# Patient Record
Sex: MALE | Race: WHITE | NOT HISPANIC OR LATINO | Employment: FULL TIME | URBAN - METROPOLITAN AREA
[De-identification: names, ages, dates, MRNs, and addresses within clinical notes are randomized per-mention and may not be internally consistent; named-entity substitution may affect disease eponyms.]

---

## 2017-04-07 ENCOUNTER — RECORDS - HEALTHEAST (OUTPATIENT)
Dept: LAB | Facility: CLINIC | Age: 52
End: 2017-04-07

## 2017-04-07 LAB
CHOLEST SERPL-MCNC: 210 MG/DL
FASTING STATUS PATIENT QL REPORTED: NO
HDLC SERPL-MCNC: 47 MG/DL
LDLC SERPL CALC-MCNC: 139 MG/DL
TRIGL SERPL-MCNC: 121 MG/DL

## 2017-11-14 ENCOUNTER — RECORDS - HEALTHEAST (OUTPATIENT)
Dept: LAB | Facility: CLINIC | Age: 52
End: 2017-11-14

## 2017-11-14 LAB — PSA SERPL-MCNC: 0.8 NG/ML (ref 0–3.5)

## 2019-03-08 ENCOUNTER — RECORDS - HEALTHEAST (OUTPATIENT)
Dept: LAB | Facility: CLINIC | Age: 54
End: 2019-03-08

## 2019-03-08 LAB
ANION GAP SERPL CALCULATED.3IONS-SCNC: 8 MMOL/L (ref 5–18)
BUN SERPL-MCNC: 23 MG/DL (ref 8–22)
CALCIUM SERPL-MCNC: 9.3 MG/DL (ref 8.5–10.5)
CHLORIDE BLD-SCNC: 108 MMOL/L (ref 98–107)
CO2 SERPL-SCNC: 24 MMOL/L (ref 22–31)
CREAT SERPL-MCNC: 1.03 MG/DL (ref 0.7–1.3)
GFR SERPL CREATININE-BSD FRML MDRD: >60 ML/MIN/1.73M2
GLUCOSE BLD-MCNC: 87 MG/DL (ref 70–125)
POTASSIUM BLD-SCNC: 4.5 MMOL/L (ref 3.5–5)
SODIUM SERPL-SCNC: 140 MMOL/L (ref 136–145)
T4 FREE SERPL-MCNC: 0.7 NG/DL (ref 0.7–1.8)
TSH SERPL DL<=0.005 MIU/L-ACNC: 5.42 UIU/ML (ref 0.3–5)

## 2019-09-13 ENCOUNTER — RECORDS - HEALTHEAST (OUTPATIENT)
Dept: LAB | Facility: CLINIC | Age: 54
End: 2019-09-13

## 2019-09-13 LAB
ANION GAP SERPL CALCULATED.3IONS-SCNC: 8 MMOL/L (ref 5–18)
BUN SERPL-MCNC: 20 MG/DL (ref 8–22)
CALCIUM SERPL-MCNC: 9.5 MG/DL (ref 8.5–10.5)
CHLORIDE BLD-SCNC: 108 MMOL/L (ref 98–107)
CHOLEST SERPL-MCNC: 205 MG/DL
CO2 SERPL-SCNC: 24 MMOL/L (ref 22–31)
CREAT SERPL-MCNC: 1.13 MG/DL (ref 0.7–1.3)
FASTING STATUS PATIENT QL REPORTED: YES
GFR SERPL CREATININE-BSD FRML MDRD: >60 ML/MIN/1.73M2
GLUCOSE BLD-MCNC: 93 MG/DL (ref 70–125)
HDLC SERPL-MCNC: 46 MG/DL
LDLC SERPL CALC-MCNC: 137 MG/DL
POTASSIUM BLD-SCNC: 4.4 MMOL/L (ref 3.5–5)
SODIUM SERPL-SCNC: 140 MMOL/L (ref 136–145)
TRIGL SERPL-MCNC: 108 MG/DL
TSH SERPL DL<=0.005 MIU/L-ACNC: 0.99 UIU/ML (ref 0.3–5)

## 2020-03-13 ENCOUNTER — RECORDS - HEALTHEAST (OUTPATIENT)
Dept: LAB | Facility: CLINIC | Age: 55
End: 2020-03-13

## 2020-03-13 LAB
PSA SERPL-MCNC: 0.6 NG/ML (ref 0–3.5)
TSH SERPL DL<=0.005 MIU/L-ACNC: 4 UIU/ML (ref 0.3–5)

## 2021-06-18 ENCOUNTER — RECORDS - HEALTHEAST (OUTPATIENT)
Dept: LAB | Facility: CLINIC | Age: 56
End: 2021-06-18

## 2021-06-18 LAB
ANION GAP SERPL CALCULATED.3IONS-SCNC: 8 MMOL/L (ref 5–18)
BUN SERPL-MCNC: 19 MG/DL (ref 8–22)
CALCIUM SERPL-MCNC: 8.5 MG/DL (ref 8.5–10.5)
CHLORIDE BLD-SCNC: 106 MMOL/L (ref 98–107)
CHOLEST SERPL-MCNC: 196 MG/DL
CO2 SERPL-SCNC: 24 MMOL/L (ref 22–31)
CREAT SERPL-MCNC: 0.98 MG/DL (ref 0.7–1.3)
FASTING STATUS PATIENT QL REPORTED: NORMAL
GFR SERPL CREATININE-BSD FRML MDRD: >60 ML/MIN/1.73M2
GLUCOSE BLD-MCNC: 93 MG/DL (ref 70–125)
HDLC SERPL-MCNC: 42 MG/DL
LDLC SERPL CALC-MCNC: 129 MG/DL
POTASSIUM BLD-SCNC: 4.2 MMOL/L (ref 3.5–5)
PSA SERPL-MCNC: 0.5 NG/ML (ref 0–3.5)
SODIUM SERPL-SCNC: 138 MMOL/L (ref 136–145)
T3FREE SERPL-MCNC: 7.6 PG/ML (ref 1.9–3.9)
T4 FREE SERPL-MCNC: 0.9 NG/DL (ref 0.7–1.8)
TRIGL SERPL-MCNC: 127 MG/DL
TSH SERPL DL<=0.005 MIU/L-ACNC: 2.03 UIU/ML (ref 0.3–5)

## 2021-08-27 ENCOUNTER — LAB REQUISITION (OUTPATIENT)
Dept: LAB | Facility: CLINIC | Age: 56
End: 2021-08-27

## 2021-08-27 DIAGNOSIS — E03.9 HYPOTHYROIDISM, UNSPECIFIED: ICD-10-CM

## 2021-08-27 LAB
T4 FREE SERPL-MCNC: 0.86 NG/DL (ref 0.7–1.8)
TSH SERPL DL<=0.005 MIU/L-ACNC: 0.15 UIU/ML (ref 0.3–5)

## 2021-08-27 PROCEDURE — 84439 ASSAY OF FREE THYROXINE: CPT | Performed by: FAMILY MEDICINE

## 2021-08-27 PROCEDURE — 84443 ASSAY THYROID STIM HORMONE: CPT | Performed by: FAMILY MEDICINE

## 2021-10-11 ENCOUNTER — LAB REQUISITION (OUTPATIENT)
Dept: LAB | Facility: CLINIC | Age: 56
End: 2021-10-11

## 2021-10-11 DIAGNOSIS — E03.9 HYPOTHYROIDISM, UNSPECIFIED: ICD-10-CM

## 2021-10-11 LAB — TSH SERPL DL<=0.005 MIU/L-ACNC: 2.51 UIU/ML (ref 0.3–5)

## 2021-10-11 PROCEDURE — 84443 ASSAY THYROID STIM HORMONE: CPT | Performed by: FAMILY MEDICINE

## 2021-10-24 ENCOUNTER — HOSPITAL ENCOUNTER (INPATIENT)
Facility: HOSPITAL | Age: 56
LOS: 2 days | Discharge: HOME | End: 2021-10-27
Attending: EMERGENCY MEDICINE | Admitting: HOSPITALIST
Payer: COMMERCIAL

## 2021-10-24 ENCOUNTER — APPOINTMENT (OUTPATIENT)
Dept: CT IMAGING | Facility: HOSPITAL | Age: 56
End: 2021-10-24
Attending: EMERGENCY MEDICINE
Payer: COMMERCIAL

## 2021-10-24 ENCOUNTER — OFFICE VISIT (OUTPATIENT)
Dept: FAMILY MEDICINE | Facility: CLINIC | Age: 56
End: 2021-10-24
Payer: COMMERCIAL

## 2021-10-24 VITALS
DIASTOLIC BLOOD PRESSURE: 80 MMHG | TEMPERATURE: 98.7 F | SYSTOLIC BLOOD PRESSURE: 123 MMHG | OXYGEN SATURATION: 92 % | HEART RATE: 87 BPM

## 2021-10-24 DIAGNOSIS — K42.0 UMBILICAL HERNIA WITH OBSTRUCTION: ICD-10-CM

## 2021-10-24 DIAGNOSIS — R10.33 PERIUMBILICAL ABDOMINAL PAIN: Primary | ICD-10-CM

## 2021-10-24 PROBLEM — I10 HYPERTENSION: Status: ACTIVE | Noted: 2021-10-24

## 2021-10-24 PROBLEM — E03.4 ACQUIRED ATROPHY OF THYROID: Status: ACTIVE | Noted: 2021-10-24

## 2021-10-24 PROBLEM — E03.9 HYPOTHYROIDISM: Status: ACTIVE | Noted: 2021-10-24

## 2021-10-24 PROBLEM — Z72.0 TOBACCO USER: Status: ACTIVE | Noted: 2021-10-24

## 2021-10-24 PROBLEM — E66.9 OBESITY: Status: ACTIVE | Noted: 2021-10-24

## 2021-10-24 PROBLEM — B37.2 CANDIDIASIS OF SKIN AND NAILS: Status: ACTIVE | Noted: 2021-10-24

## 2021-10-24 LAB
ALBUMIN SERPL-MCNC: 4.2 G/DL (ref 3.5–5)
ALP SERPL-CCNC: 99 U/L (ref 45–120)
ALT SERPL W P-5'-P-CCNC: 23 U/L (ref 0–45)
ANION GAP SERPL CALCULATED.3IONS-SCNC: 16 MMOL/L (ref 5–18)
AST SERPL W P-5'-P-CCNC: 19 U/L (ref 0–40)
BILIRUB SERPL-MCNC: 1.1 MG/DL (ref 0–1)
BUN SERPL-MCNC: 28 MG/DL (ref 8–22)
CALCIUM SERPL-MCNC: 10.9 MG/DL (ref 8.5–10.5)
CHLORIDE BLD-SCNC: 94 MMOL/L (ref 98–107)
CO2 SERPL-SCNC: 30 MMOL/L (ref 22–31)
CREAT SERPL-MCNC: 1.82 MG/DL (ref 0.7–1.3)
ERYTHROCYTE [DISTWIDTH] IN BLOOD BY AUTOMATED COUNT: 13.5 % (ref 10–15)
GFR SERPL CREATININE-BSD FRML MDRD: 40 ML/MIN/1.73M2
GLUCOSE BLD-MCNC: 120 MG/DL (ref 70–125)
HCT VFR BLD AUTO: 49.9 % (ref 40–53)
HGB BLD-MCNC: 17 G/DL (ref 13.3–17.7)
LACTATE SERPL-SCNC: 1.3 MMOL/L (ref 0.7–2)
LIPASE SERPL-CCNC: 29 U/L (ref 0–52)
MCH RBC QN AUTO: 31.4 PG (ref 26.5–33)
MCHC RBC AUTO-ENTMCNC: 34.1 G/DL (ref 31.5–36.5)
MCV RBC AUTO: 92 FL (ref 78–100)
PLATELET # BLD AUTO: 253 10E3/UL (ref 150–450)
POTASSIUM BLD-SCNC: 3.9 MMOL/L (ref 3.5–5)
PROT SERPL-MCNC: 7.7 G/DL (ref 6–8)
RBC # BLD AUTO: 5.42 10E6/UL (ref 4.4–5.9)
SARS-COV-2 RNA RESP QL NAA+PROBE: NEGATIVE
SODIUM SERPL-SCNC: 140 MMOL/L (ref 136–145)
WBC # BLD AUTO: 12.3 10E3/UL (ref 4–11)

## 2021-10-24 PROCEDURE — 80053 COMPREHEN METABOLIC PANEL: CPT | Performed by: EMERGENCY MEDICINE

## 2021-10-24 PROCEDURE — 99215 OFFICE O/P EST HI 40 MIN: CPT | Performed by: NURSE PRACTITIONER

## 2021-10-24 PROCEDURE — 250N000009 HC RX 250: Performed by: EMERGENCY MEDICINE

## 2021-10-24 PROCEDURE — 83690 ASSAY OF LIPASE: CPT | Performed by: EMERGENCY MEDICINE

## 2021-10-24 PROCEDURE — 36415 COLL VENOUS BLD VENIPUNCTURE: CPT | Performed by: EMERGENCY MEDICINE

## 2021-10-24 PROCEDURE — 83605 ASSAY OF LACTIC ACID: CPT | Performed by: EMERGENCY MEDICINE

## 2021-10-24 PROCEDURE — 96375 TX/PRO/DX INJ NEW DRUG ADDON: CPT

## 2021-10-24 PROCEDURE — 96361 HYDRATE IV INFUSION ADD-ON: CPT

## 2021-10-24 PROCEDURE — 87635 SARS-COV-2 COVID-19 AMP PRB: CPT | Performed by: EMERGENCY MEDICINE

## 2021-10-24 PROCEDURE — 250N000011 HC RX IP 250 OP 636: Performed by: EMERGENCY MEDICINE

## 2021-10-24 PROCEDURE — 258N000003 HC RX IP 258 OP 636: Performed by: EMERGENCY MEDICINE

## 2021-10-24 PROCEDURE — G0378 HOSPITAL OBSERVATION PER HR: HCPCS

## 2021-10-24 PROCEDURE — 96374 THER/PROPH/DIAG INJ IV PUSH: CPT | Mod: 59

## 2021-10-24 PROCEDURE — 99285 EMERGENCY DEPT VISIT HI MDM: CPT | Mod: 25

## 2021-10-24 PROCEDURE — 74177 CT ABD & PELVIS W/CONTRAST: CPT

## 2021-10-24 PROCEDURE — C9803 HOPD COVID-19 SPEC COLLECT: HCPCS

## 2021-10-24 PROCEDURE — 85027 COMPLETE CBC AUTOMATED: CPT | Performed by: EMERGENCY MEDICINE

## 2021-10-24 RX ORDER — THYROID, PORCINE 240 MG/1
240 TABLET ORAL DAILY
COMMUNITY
Start: 2021-09-30

## 2021-10-24 RX ORDER — IOPAMIDOL 755 MG/ML
75 INJECTION, SOLUTION INTRAVASCULAR ONCE
Status: COMPLETED | OUTPATIENT
Start: 2021-10-24 | End: 2021-10-24

## 2021-10-24 RX ORDER — LISINOPRIL 10 MG/1
10 TABLET ORAL DAILY
COMMUNITY
Start: 2021-08-19

## 2021-10-24 RX ORDER — MULTIPLE VITAMINS W/ MINERALS TAB 9MG-400MCG
1 TAB ORAL DAILY
COMMUNITY
End: 2021-11-03

## 2021-10-24 RX ORDER — ONDANSETRON 2 MG/ML
4 INJECTION INTRAMUSCULAR; INTRAVENOUS ONCE
Status: COMPLETED | OUTPATIENT
Start: 2021-10-24 | End: 2021-10-24

## 2021-10-24 RX ORDER — MORPHINE SULFATE 4 MG/ML
4 INJECTION, SOLUTION INTRAMUSCULAR; INTRAVENOUS ONCE
Status: COMPLETED | OUTPATIENT
Start: 2021-10-24 | End: 2021-10-24

## 2021-10-24 RX ADMIN — MORPHINE SULFATE 4 MG: 4 INJECTION INTRAVENOUS at 20:36

## 2021-10-24 RX ADMIN — ONDANSETRON 4 MG: 2 INJECTION INTRAMUSCULAR; INTRAVENOUS at 21:14

## 2021-10-24 RX ADMIN — FAMOTIDINE 20 MG: 10 INJECTION, SOLUTION INTRAVENOUS at 21:14

## 2021-10-24 RX ADMIN — IOPAMIDOL 75 ML: 755 INJECTION, SOLUTION INTRAVENOUS at 21:29

## 2021-10-24 RX ADMIN — SODIUM CHLORIDE 1000 ML: 9 INJECTION, SOLUTION INTRAVENOUS at 20:36

## 2021-10-24 NOTE — ED TRIAGE NOTES
"Patient arrives by private car for evaluation of \"inflammed hernia\"  Patient reports abdominal swelling and pain.  Has umbilical hernia.   "

## 2021-10-24 NOTE — LETTER
October 26, 2021      RE: Leon Gresham  [address]  [place] [address]        To whom it may concern:    Leon Gresham is under my professional care for Umbilical hernia with obstruction. He should remain out of work until 11/8/21.    Sincerely,      Jose Craven PA-C  Pager - [phone]  Phone - [phone]   General Surgery

## 2021-10-24 NOTE — PROGRESS NOTES
Assessment & Plan    Periumbilical abdominal pain with erythema and warmth  NP called report over to [place] [facility] ED    Noted Oxygen saturation is 92%   [id number]}    Return today (on 10/24/2021).  Kat Levi NP, NP  [facility] ROBERT Quintero is a 56 year old male accompanied by his mother and brother who presents to clinic today for the following health issues:   Chief Complaint   Patient presents with     Vomiting     X2 days since he ate hot dogs from the gas station     Abdominal Pain     Umbilical hernia pain X1 day   Patient states developed vomiting on Saturday morning 10/22/2012 after he ate hot dogs from the gas station on Friday 10/22/2021. Patient states after eating those hot dogs he has had an upset stomach and noted some pressure.  The next day Saturday 10/23/2021 patient had been in bed most of day the started to vomited. His mother and brother state that the patient does not complain of pain and when he does it is significant.     Patient states he has had umbilical hernia without pain until the last 24 hours it has been 8/10 for pain with redness and increased warm to the touch. Patient applied a Lidocaine patch to the area with no relief of pain. Patient denied fever.    Objective    /80 (BP Location: Right arm, Patient Position: Sitting, Cuff Size: Adult Regular)   Pulse 87   Temp 98.7  F (37.1  C) (Oral)   SpO2 92%   Physical Exam  Vitals and nursing note reviewed.   HENT:      Head: Normocephalic.   Cardiovascular:      Rate and Rhythm: Normal rate and regular rhythm.      Pulses: Normal pulses.      Heart sounds: Normal heart sounds.   Abdominal:      Comments: Abdominal umbilical hernia but erythema and warm to touch about 4-6 cm by 4-6 cm

## 2021-10-24 NOTE — ED PROVIDER NOTES
Expected Patient Referral to ED  6:21 PM    Referring Clinic/Provider:  [facility]    Reason for referral/Clinical facts:  Patient with history of umbilical hernia.  Now with 2 days of discomfort.  Markedly tender with periumbilical erythema    Recommendations provided:  See and treat      Informed caller that recommendations provided are recommendations based only on the facts provided and that they responsible to accept or reject the advice, or to seek a formal in person consultation as needed and that this ED will see/treat patient should they arrive.      Gus Nash MD  Emergency Medicine  [facility] EMERGENCY DEPARTMENT  [address] [place] [id number]  [phone]     Gus Nash MD  10/24/21 [id number]

## 2021-10-25 ENCOUNTER — ANESTHESIA (OUTPATIENT)
Dept: SURGERY | Facility: HOSPITAL | Age: 56
End: 2021-10-25
Payer: COMMERCIAL

## 2021-10-25 ENCOUNTER — APPOINTMENT (OUTPATIENT)
Dept: RADIOLOGY | Facility: HOSPITAL | Age: 56
End: 2021-10-25
Attending: HOSPITALIST
Payer: COMMERCIAL

## 2021-10-25 ENCOUNTER — ANESTHESIA EVENT (OUTPATIENT)
Dept: SURGERY | Facility: HOSPITAL | Age: 56
End: 2021-10-25
Payer: COMMERCIAL

## 2021-10-25 PROBLEM — N17.9 AKI (ACUTE KIDNEY INJURY) (H): Status: ACTIVE | Noted: 2021-10-25

## 2021-10-25 LAB
ANION GAP SERPL CALCULATED.3IONS-SCNC: 9 MMOL/L (ref 5–18)
BUN SERPL-MCNC: 27 MG/DL (ref 8–22)
CALCIUM SERPL-MCNC: 9.2 MG/DL (ref 8.5–10.5)
CHLORIDE BLD-SCNC: 101 MMOL/L (ref 98–107)
CO2 SERPL-SCNC: 29 MMOL/L (ref 22–31)
CREAT SERPL-MCNC: 1.38 MG/DL (ref 0.7–1.3)
GFR SERPL CREATININE-BSD FRML MDRD: 56 ML/MIN/1.73M2
GLUCOSE BLD-MCNC: 94 MG/DL (ref 70–125)
GLUCOSE BLDC GLUCOMTR-MCNC: 131 MG/DL (ref 70–99)
HOLD SPECIMEN: NORMAL
LACTATE SERPL-SCNC: 0.7 MMOL/L (ref 0.7–2)
MAGNESIUM SERPL-MCNC: 2.2 MG/DL (ref 1.8–2.6)
POTASSIUM BLD-SCNC: 4 MMOL/L (ref 3.5–5)
SODIUM SERPL-SCNC: 139 MMOL/L (ref 136–145)

## 2021-10-25 PROCEDURE — C9113 INJ PANTOPRAZOLE SODIUM, VIA: HCPCS | Performed by: INTERNAL MEDICINE

## 2021-10-25 PROCEDURE — 96376 TX/PRO/DX INJ SAME DRUG ADON: CPT

## 2021-10-25 PROCEDURE — 120N000001 HC R&B MED SURG/OB

## 2021-10-25 PROCEDURE — 99207 PR NO CHARGE LOS: CPT | Performed by: SURGERY

## 2021-10-25 PROCEDURE — 250N000011 HC RX IP 250 OP 636: Performed by: HOSPITALIST

## 2021-10-25 PROCEDURE — 258N000003 HC RX IP 258 OP 636: Performed by: INTERNAL MEDICINE

## 2021-10-25 PROCEDURE — 258N000003 HC RX IP 258 OP 636: Performed by: SURGERY

## 2021-10-25 PROCEDURE — 83735 ASSAY OF MAGNESIUM: CPT | Performed by: INTERNAL MEDICINE

## 2021-10-25 PROCEDURE — 96375 TX/PRO/DX INJ NEW DRUG ADDON: CPT

## 2021-10-25 PROCEDURE — 999N000141 HC STATISTIC PRE-PROCEDURE NURSING ASSESSMENT: Performed by: SURGERY

## 2021-10-25 PROCEDURE — 250N000011 HC RX IP 250 OP 636: Performed by: SURGERY

## 2021-10-25 PROCEDURE — 272N000001 HC OR GENERAL SUPPLY STERILE: Performed by: SURGERY

## 2021-10-25 PROCEDURE — 250N000009 HC RX 250: Performed by: NURSE ANESTHETIST, CERTIFIED REGISTERED

## 2021-10-25 PROCEDURE — 360N000075 HC SURGERY LEVEL 2, PER MIN: Performed by: SURGERY

## 2021-10-25 PROCEDURE — G0378 HOSPITAL OBSERVATION PER HR: HCPCS

## 2021-10-25 PROCEDURE — 88302 TISSUE EXAM BY PATHOLOGIST: CPT | Mod: TC | Performed by: SURGERY

## 2021-10-25 PROCEDURE — 250N000011 HC RX IP 250 OP 636: Performed by: INTERNAL MEDICINE

## 2021-10-25 PROCEDURE — 99222 1ST HOSP IP/OBS MODERATE 55: CPT | Mod: AI | Performed by: HOSPITALIST

## 2021-10-25 PROCEDURE — 250N000011 HC RX IP 250 OP 636: Performed by: ANESTHESIOLOGY

## 2021-10-25 PROCEDURE — 99207 PR NO CHARGE LOS: CPT | Performed by: NURSE PRACTITIONER

## 2021-10-25 PROCEDURE — 250N000025 HC SEVOFLURANE, PER MIN: Performed by: SURGERY

## 2021-10-25 PROCEDURE — 250N000011 HC RX IP 250 OP 636: Performed by: NURSE ANESTHETIST, CERTIFIED REGISTERED

## 2021-10-25 PROCEDURE — 258N000003 HC RX IP 258 OP 636: Performed by: HOSPITALIST

## 2021-10-25 PROCEDURE — 74018 RADEX ABDOMEN 1 VIEW: CPT

## 2021-10-25 PROCEDURE — 0DN80ZZ RELEASE SMALL INTESTINE, OPEN APPROACH: ICD-10-PCS | Performed by: SURGERY

## 2021-10-25 PROCEDURE — 96361 HYDRATE IV INFUSION ADD-ON: CPT

## 2021-10-25 PROCEDURE — 36415 COLL VENOUS BLD VENIPUNCTURE: CPT | Performed by: HOSPITALIST

## 2021-10-25 PROCEDURE — 250N000011 HC RX IP 250 OP 636: Performed by: STUDENT IN AN ORGANIZED HEALTH CARE EDUCATION/TRAINING PROGRAM

## 2021-10-25 PROCEDURE — 80048 BASIC METABOLIC PNL TOTAL CA: CPT | Performed by: HOSPITALIST

## 2021-10-25 PROCEDURE — 83605 ASSAY OF LACTIC ACID: CPT | Performed by: HOSPITALIST

## 2021-10-25 PROCEDURE — 710N000009 HC RECOVERY PHASE 1, LEVEL 1, PER MIN: Performed by: SURGERY

## 2021-10-25 PROCEDURE — 370N000017 HC ANESTHESIA TECHNICAL FEE, PER MIN: Performed by: SURGERY

## 2021-10-25 PROCEDURE — 49587 PR REPAIR UMBILICAL HERN,5+Y/O,STRANG: CPT | Performed by: SURGERY

## 2021-10-25 PROCEDURE — 258N000003 HC RX IP 258 OP 636: Performed by: NURSE ANESTHETIST, CERTIFIED REGISTERED

## 2021-10-25 PROCEDURE — 99221 1ST HOSP IP/OBS SF/LOW 40: CPT | Mod: 57 | Performed by: SURGERY

## 2021-10-25 RX ORDER — NALOXONE HYDROCHLORIDE 0.4 MG/ML
0.4 INJECTION, SOLUTION INTRAMUSCULAR; INTRAVENOUS; SUBCUTANEOUS
Status: DISCONTINUED | OUTPATIENT
Start: 2021-10-25 | End: 2021-10-27 | Stop reason: HOSPADM

## 2021-10-25 RX ORDER — ACETAMINOPHEN 325 MG/1
650 TABLET ORAL EVERY 6 HOURS PRN
Status: DISCONTINUED | OUTPATIENT
Start: 2021-10-25 | End: 2021-10-26

## 2021-10-25 RX ORDER — KETOROLAC TROMETHAMINE 30 MG/ML
30 INJECTION, SOLUTION INTRAMUSCULAR; INTRAVENOUS EVERY 6 HOURS PRN
Status: DISCONTINUED | OUTPATIENT
Start: 2021-10-25 | End: 2021-10-26

## 2021-10-25 RX ORDER — PIPERACILLIN SODIUM, TAZOBACTAM SODIUM 3; .375 G/15ML; G/15ML
3.38 INJECTION, POWDER, LYOPHILIZED, FOR SOLUTION INTRAVENOUS EVERY 6 HOURS
Status: DISCONTINUED | OUTPATIENT
Start: 2021-10-25 | End: 2021-10-25

## 2021-10-25 RX ORDER — PIPERACILLIN SODIUM, TAZOBACTAM SODIUM 3; .375 G/15ML; G/15ML
3.38 INJECTION, POWDER, LYOPHILIZED, FOR SOLUTION INTRAVENOUS EVERY 8 HOURS
Status: DISCONTINUED | OUTPATIENT
Start: 2021-10-26 | End: 2021-10-27 | Stop reason: ALTCHOICE

## 2021-10-25 RX ORDER — NALOXONE HYDROCHLORIDE 0.4 MG/ML
0.2 INJECTION, SOLUTION INTRAMUSCULAR; INTRAVENOUS; SUBCUTANEOUS
Status: DISCONTINUED | OUTPATIENT
Start: 2021-10-25 | End: 2021-10-27 | Stop reason: HOSPADM

## 2021-10-25 RX ORDER — OXYCODONE HYDROCHLORIDE 5 MG/1
10 TABLET ORAL EVERY 4 HOURS PRN
Status: DISCONTINUED | OUTPATIENT
Start: 2021-10-25 | End: 2021-10-27 | Stop reason: HOSPADM

## 2021-10-25 RX ORDER — KETAMINE HYDROCHLORIDE 10 MG/ML
INJECTION INTRAMUSCULAR; INTRAVENOUS PRN
Status: DISCONTINUED | OUTPATIENT
Start: 2021-10-25 | End: 2021-10-25

## 2021-10-25 RX ORDER — MAGNESIUM SULFATE 4 G/50ML
4 INJECTION INTRAVENOUS ONCE
Status: COMPLETED | OUTPATIENT
Start: 2021-10-25 | End: 2021-10-25

## 2021-10-25 RX ORDER — SODIUM CHLORIDE, SODIUM LACTATE, POTASSIUM CHLORIDE, CALCIUM CHLORIDE 600; 310; 30; 20 MG/100ML; MG/100ML; MG/100ML; MG/100ML
INJECTION, SOLUTION INTRAVENOUS CONTINUOUS
Status: DISCONTINUED | OUTPATIENT
Start: 2021-10-25 | End: 2021-10-26

## 2021-10-25 RX ORDER — ACETAMINOPHEN 325 MG/1
975 TABLET ORAL EVERY 8 HOURS
Status: DISCONTINUED | OUTPATIENT
Start: 2021-10-25 | End: 2021-10-27 | Stop reason: HOSPADM

## 2021-10-25 RX ORDER — HYDROMORPHONE HCL IN WATER/PF 6 MG/30 ML
0.2 PATIENT CONTROLLED ANALGESIA SYRINGE INTRAVENOUS
Status: DISCONTINUED | OUTPATIENT
Start: 2021-10-25 | End: 2021-10-26

## 2021-10-25 RX ORDER — POLYETHYLENE GLYCOL 3350 17 G/17G
17 POWDER, FOR SOLUTION ORAL DAILY
Status: DISCONTINUED | OUTPATIENT
Start: 2021-10-26 | End: 2021-10-27 | Stop reason: HOSPADM

## 2021-10-25 RX ORDER — ONDANSETRON 4 MG/1
4 TABLET, ORALLY DISINTEGRATING ORAL EVERY 6 HOURS PRN
Status: DISCONTINUED | OUTPATIENT
Start: 2021-10-25 | End: 2021-10-26

## 2021-10-25 RX ORDER — ONDANSETRON 2 MG/ML
4 INJECTION INTRAMUSCULAR; INTRAVENOUS EVERY 30 MIN PRN
Status: DISCONTINUED | OUTPATIENT
Start: 2021-10-25 | End: 2021-10-25 | Stop reason: HOSPADM

## 2021-10-25 RX ORDER — BISACODYL 10 MG
10 SUPPOSITORY, RECTAL RECTAL DAILY PRN
Status: DISCONTINUED | OUTPATIENT
Start: 2021-10-25 | End: 2021-10-27 | Stop reason: HOSPADM

## 2021-10-25 RX ORDER — PROCHLORPERAZINE 25 MG
25 SUPPOSITORY, RECTAL RECTAL EVERY 12 HOURS PRN
Status: DISCONTINUED | OUTPATIENT
Start: 2021-10-25 | End: 2021-10-27 | Stop reason: HOSPADM

## 2021-10-25 RX ORDER — ONDANSETRON 2 MG/ML
4 INJECTION INTRAMUSCULAR; INTRAVENOUS EVERY 6 HOURS PRN
Status: DISCONTINUED | OUTPATIENT
Start: 2021-10-25 | End: 2021-10-26

## 2021-10-25 RX ORDER — FENTANYL CITRATE 50 UG/ML
INJECTION, SOLUTION INTRAMUSCULAR; INTRAVENOUS PRN
Status: DISCONTINUED | OUTPATIENT
Start: 2021-10-25 | End: 2021-10-25

## 2021-10-25 RX ORDER — DEXAMETHASONE SODIUM PHOSPHATE 10 MG/ML
INJECTION, SOLUTION INTRAMUSCULAR; INTRAVENOUS PRN
Status: DISCONTINUED | OUTPATIENT
Start: 2021-10-25 | End: 2021-10-25

## 2021-10-25 RX ORDER — ONDANSETRON 4 MG/1
4 TABLET, ORALLY DISINTEGRATING ORAL EVERY 6 HOURS PRN
Status: DISCONTINUED | OUTPATIENT
Start: 2021-10-25 | End: 2021-10-27 | Stop reason: HOSPADM

## 2021-10-25 RX ORDER — FAMOTIDINE 20 MG/1
20 TABLET, FILM COATED ORAL 2 TIMES DAILY
Status: DISCONTINUED | OUTPATIENT
Start: 2021-10-25 | End: 2021-10-25

## 2021-10-25 RX ORDER — PROPOFOL 10 MG/ML
INJECTION, EMULSION INTRAVENOUS PRN
Status: DISCONTINUED | OUTPATIENT
Start: 2021-10-25 | End: 2021-10-25

## 2021-10-25 RX ORDER — MORPHINE SULFATE 2 MG/ML
2-4 INJECTION, SOLUTION INTRAMUSCULAR; INTRAVENOUS
Status: DISCONTINUED | OUTPATIENT
Start: 2021-10-25 | End: 2021-10-25

## 2021-10-25 RX ORDER — ONDANSETRON 2 MG/ML
4 INJECTION INTRAMUSCULAR; INTRAVENOUS EVERY 6 HOURS PRN
Status: DISCONTINUED | OUTPATIENT
Start: 2021-10-25 | End: 2021-10-27 | Stop reason: HOSPADM

## 2021-10-25 RX ORDER — FAMOTIDINE 20 MG/1
20 TABLET, FILM COATED ORAL EVERY 12 HOURS
Status: DISCONTINUED | OUTPATIENT
Start: 2021-10-25 | End: 2021-10-26

## 2021-10-25 RX ORDER — AMOXICILLIN 250 MG
1 CAPSULE ORAL 2 TIMES DAILY
Status: DISCONTINUED | OUTPATIENT
Start: 2021-10-25 | End: 2021-10-27 | Stop reason: HOSPADM

## 2021-10-25 RX ORDER — PIPERACILLIN SODIUM, TAZOBACTAM SODIUM 3; .375 G/15ML; G/15ML
3.38 INJECTION, POWDER, LYOPHILIZED, FOR SOLUTION INTRAVENOUS ONCE
Status: COMPLETED | OUTPATIENT
Start: 2021-10-25 | End: 2021-10-25

## 2021-10-25 RX ORDER — PROPOFOL 10 MG/ML
INJECTION, EMULSION INTRAVENOUS CONTINUOUS PRN
Status: DISCONTINUED | OUTPATIENT
Start: 2021-10-25 | End: 2021-10-25

## 2021-10-25 RX ORDER — PROCHLORPERAZINE MALEATE 10 MG
10 TABLET ORAL EVERY 6 HOURS PRN
Status: DISCONTINUED | OUTPATIENT
Start: 2021-10-25 | End: 2021-10-27 | Stop reason: HOSPADM

## 2021-10-25 RX ORDER — HYDROMORPHONE HCL IN WATER/PF 6 MG/30 ML
0.4 PATIENT CONTROLLED ANALGESIA SYRINGE INTRAVENOUS
Status: DISCONTINUED | OUTPATIENT
Start: 2021-10-25 | End: 2021-10-27 | Stop reason: HOSPADM

## 2021-10-25 RX ORDER — ACETAMINOPHEN 650 MG/1
650 SUPPOSITORY RECTAL EVERY 6 HOURS PRN
Status: DISCONTINUED | OUTPATIENT
Start: 2021-10-25 | End: 2021-10-26

## 2021-10-25 RX ORDER — OXYCODONE HYDROCHLORIDE 5 MG/1
5 TABLET ORAL EVERY 4 HOURS PRN
Status: DISCONTINUED | OUTPATIENT
Start: 2021-10-25 | End: 2021-10-25 | Stop reason: HOSPADM

## 2021-10-25 RX ORDER — ACETAMINOPHEN 325 MG/1
650 TABLET ORAL EVERY 4 HOURS PRN
Status: DISCONTINUED | OUTPATIENT
Start: 2021-10-28 | End: 2021-10-26

## 2021-10-25 RX ORDER — DEXTROSE MONOHYDRATE, SODIUM CHLORIDE, AND POTASSIUM CHLORIDE 50; 1.49; 4.5 G/1000ML; G/1000ML; G/1000ML
INJECTION, SOLUTION INTRAVENOUS CONTINUOUS
Status: DISCONTINUED | OUTPATIENT
Start: 2021-10-25 | End: 2021-10-26

## 2021-10-25 RX ORDER — ONDANSETRON 4 MG/1
4 TABLET, ORALLY DISINTEGRATING ORAL EVERY 30 MIN PRN
Status: DISCONTINUED | OUTPATIENT
Start: 2021-10-25 | End: 2021-10-25 | Stop reason: HOSPADM

## 2021-10-25 RX ORDER — SODIUM CHLORIDE, SODIUM LACTATE, POTASSIUM CHLORIDE, CALCIUM CHLORIDE 600; 310; 30; 20 MG/100ML; MG/100ML; MG/100ML; MG/100ML
INJECTION, SOLUTION INTRAVENOUS CONTINUOUS
Status: DISCONTINUED | OUTPATIENT
Start: 2021-10-25 | End: 2021-10-25 | Stop reason: HOSPADM

## 2021-10-25 RX ORDER — CEFAZOLIN SODIUM 2 G/100ML
2 INJECTION, SOLUTION INTRAVENOUS
Status: COMPLETED | OUTPATIENT
Start: 2021-10-25 | End: 2021-10-25

## 2021-10-25 RX ORDER — SODIUM CHLORIDE, SODIUM LACTATE, POTASSIUM CHLORIDE, CALCIUM CHLORIDE 600; 310; 30; 20 MG/100ML; MG/100ML; MG/100ML; MG/100ML
INJECTION, SOLUTION INTRAVENOUS CONTINUOUS
Status: DISCONTINUED | OUTPATIENT
Start: 2021-10-25 | End: 2021-10-27 | Stop reason: HOSPADM

## 2021-10-25 RX ORDER — PROCHLORPERAZINE MALEATE 10 MG
10 TABLET ORAL EVERY 6 HOURS PRN
Status: DISCONTINUED | OUTPATIENT
Start: 2021-10-25 | End: 2021-10-26

## 2021-10-25 RX ORDER — FENTANYL CITRATE 50 UG/ML
25 INJECTION, SOLUTION INTRAMUSCULAR; INTRAVENOUS EVERY 5 MIN PRN
Status: DISCONTINUED | OUTPATIENT
Start: 2021-10-25 | End: 2021-10-25 | Stop reason: HOSPADM

## 2021-10-25 RX ORDER — LIDOCAINE 40 MG/G
CREAM TOPICAL
Status: DISCONTINUED | OUTPATIENT
Start: 2021-10-25 | End: 2021-10-27 | Stop reason: HOSPADM

## 2021-10-25 RX ORDER — METOCLOPRAMIDE HYDROCHLORIDE 5 MG/ML
10 INJECTION INTRAMUSCULAR; INTRAVENOUS ONCE
Status: COMPLETED | OUTPATIENT
Start: 2021-10-25 | End: 2021-10-25

## 2021-10-25 RX ORDER — CEFAZOLIN SODIUM 2 G/100ML
2 INJECTION, SOLUTION INTRAVENOUS SEE ADMIN INSTRUCTIONS
Status: DISCONTINUED | OUTPATIENT
Start: 2021-10-25 | End: 2021-10-26

## 2021-10-25 RX ORDER — HYDROMORPHONE HCL IN WATER/PF 6 MG/30 ML
0.2 PATIENT CONTROLLED ANALGESIA SYRINGE INTRAVENOUS EVERY 5 MIN PRN
Status: DISCONTINUED | OUTPATIENT
Start: 2021-10-25 | End: 2021-10-25 | Stop reason: HOSPADM

## 2021-10-25 RX ORDER — OXYCODONE HYDROCHLORIDE 5 MG/1
5-10 TABLET ORAL EVERY 4 HOURS PRN
Status: DISCONTINUED | OUTPATIENT
Start: 2021-10-25 | End: 2021-10-26

## 2021-10-25 RX ORDER — SODIUM CHLORIDE 9 MG/ML
INJECTION, SOLUTION INTRAVENOUS CONTINUOUS
Status: DISCONTINUED | OUTPATIENT
Start: 2021-10-25 | End: 2021-10-25

## 2021-10-25 RX ORDER — BUPIVACAINE HYDROCHLORIDE 2.5 MG/ML
INJECTION, SOLUTION INFILTRATION; PERINEURAL PRN
Status: DISCONTINUED | OUTPATIENT
Start: 2021-10-25 | End: 2021-10-25 | Stop reason: HOSPADM

## 2021-10-25 RX ORDER — LIDOCAINE 40 MG/G
CREAM TOPICAL
Status: DISCONTINUED | OUTPATIENT
Start: 2021-10-25 | End: 2021-10-26

## 2021-10-25 RX ORDER — LIDOCAINE HYDROCHLORIDE 20 MG/ML
INJECTION, SOLUTION INFILTRATION; PERINEURAL PRN
Status: DISCONTINUED | OUTPATIENT
Start: 2021-10-25 | End: 2021-10-25

## 2021-10-25 RX ADMIN — PHENYLEPHRINE HYDROCHLORIDE 50 MCG: 10 INJECTION INTRAVENOUS at 19:18

## 2021-10-25 RX ADMIN — DEXAMETHASONE SODIUM PHOSPHATE 10 MG: 10 INJECTION, SOLUTION INTRAMUSCULAR; INTRAVENOUS at 18:26

## 2021-10-25 RX ADMIN — PROPOFOL 200 MG: 10 INJECTION, EMULSION INTRAVENOUS at 18:26

## 2021-10-25 RX ADMIN — PROPOFOL 30 MCG/KG/MIN: 10 INJECTION, EMULSION INTRAVENOUS at 18:51

## 2021-10-25 RX ADMIN — POTASSIUM CHLORIDE, DEXTROSE MONOHYDRATE AND SODIUM CHLORIDE 100 ML/HR: 150; 5; 450 INJECTION, SOLUTION INTRAVENOUS at 20:10

## 2021-10-25 RX ADMIN — ROCURONIUM BROMIDE 30 MG: 50 INJECTION, SOLUTION INTRAVENOUS at 18:36

## 2021-10-25 RX ADMIN — METOCLOPRAMIDE HYDROCHLORIDE 10 MG: 5 INJECTION INTRAMUSCULAR; INTRAVENOUS at 00:22

## 2021-10-25 RX ADMIN — ROCURONIUM BROMIDE 10 MG: 50 INJECTION, SOLUTION INTRAVENOUS at 18:52

## 2021-10-25 RX ADMIN — PHENYLEPHRINE HYDROCHLORIDE 150 MCG: 10 INJECTION INTRAVENOUS at 18:45

## 2021-10-25 RX ADMIN — PROPOFOL 50 MG: 10 INJECTION, EMULSION INTRAVENOUS at 18:36

## 2021-10-25 RX ADMIN — SODIUM CHLORIDE, POTASSIUM CHLORIDE, SODIUM LACTATE AND CALCIUM CHLORIDE: 600; 310; 30; 20 INJECTION, SOLUTION INTRAVENOUS at 13:56

## 2021-10-25 RX ADMIN — PHENYLEPHRINE HYDROCHLORIDE 100 MCG: 10 INJECTION INTRAVENOUS at 19:08

## 2021-10-25 RX ADMIN — KETAMINE HYDROCHLORIDE 50 MG: 10 INJECTION, SOLUTION INTRAMUSCULAR; INTRAVENOUS at 18:48

## 2021-10-25 RX ADMIN — SUGAMMADEX 210 MG: 100 INJECTION, SOLUTION INTRAVENOUS at 19:26

## 2021-10-25 RX ADMIN — FENTANYL CITRATE 100 MCG: 50 INJECTION, SOLUTION INTRAMUSCULAR; INTRAVENOUS at 18:26

## 2021-10-25 RX ADMIN — PANTOPRAZOLE SODIUM 40 MG: 40 INJECTION, POWDER, FOR SOLUTION INTRAVENOUS at 16:52

## 2021-10-25 RX ADMIN — DIATRIZOATE MEGLUMINE AND DIATRIZOATE SODIUM 120 ML: 660; 100 SOLUTION ORAL; RECTAL at 11:45

## 2021-10-25 RX ADMIN — ONDANSETRON 4 MG: 2 INJECTION INTRAMUSCULAR; INTRAVENOUS at 19:05

## 2021-10-25 RX ADMIN — CEFAZOLIN SODIUM 2 G: 2 INJECTION, SOLUTION INTRAVENOUS at 18:30

## 2021-10-25 RX ADMIN — SODIUM CHLORIDE 125 ML/HR: 9 INJECTION, SOLUTION INTRAVENOUS at 04:36

## 2021-10-25 RX ADMIN — MAGNESIUM SULFATE HEPTAHYDRATE 4 G: 80 INJECTION, SOLUTION INTRAVENOUS at 18:15

## 2021-10-25 RX ADMIN — ONDANSETRON 4 MG: 2 INJECTION INTRAMUSCULAR; INTRAVENOUS at 13:30

## 2021-10-25 RX ADMIN — PHENYLEPHRINE HYDROCHLORIDE 150 MCG: 10 INJECTION INTRAVENOUS at 18:51

## 2021-10-25 RX ADMIN — PIPERACILLIN AND TAZOBACTAM 3.38 G: 3; .375 INJECTION, POWDER, LYOPHILIZED, FOR SOLUTION INTRAVENOUS at 21:49

## 2021-10-25 RX ADMIN — PROCHLORPERAZINE EDISYLATE 10 MG: 5 INJECTION INTRAMUSCULAR; INTRAVENOUS at 14:16

## 2021-10-25 RX ADMIN — SODIUM CHLORIDE, POTASSIUM CHLORIDE, SODIUM LACTATE AND CALCIUM CHLORIDE: 600; 310; 30; 20 INJECTION, SOLUTION INTRAVENOUS at 18:53

## 2021-10-25 RX ADMIN — LIDOCAINE HYDROCHLORIDE 60 MG: 20 INJECTION, SOLUTION INFILTRATION; PERINEURAL at 18:26

## 2021-10-25 RX ADMIN — PHENYLEPHRINE HYDROCHLORIDE 100 MCG: 10 INJECTION INTRAVENOUS at 18:30

## 2021-10-25 RX ADMIN — PHENYLEPHRINE HYDROCHLORIDE 100 MCG: 10 INJECTION INTRAVENOUS at 19:24

## 2021-10-25 RX ADMIN — HYDROMORPHONE HYDROCHLORIDE 0.5 MG: 1 INJECTION, SOLUTION INTRAMUSCULAR; INTRAVENOUS; SUBCUTANEOUS at 16:52

## 2021-10-25 RX ADMIN — Medication 100 MG: at 18:26

## 2021-10-25 ASSESSMENT — ACTIVITIES OF DAILY LIVING (ADL)
ADLS_ACUITY_SCORE: 7
ADLS_ACUITY_SCORE: 3
ADLS_ACUITY_SCORE: 7
ADLS_ACUITY_SCORE: 7
DIFFICULTY_COMMUNICATING: NO
ADLS_ACUITY_SCORE: 7
ADLS_ACUITY_SCORE: 7
DIFFICULTY_EATING/SWALLOWING: NO
DEPENDENT_IADLS:: INDEPENDENT
TOILETING_ISSUES: NO
ADLS_ACUITY_SCORE: 3
ADLS_ACUITY_SCORE: 3
ADLS_ACUITY_SCORE: 5
DRESSING/BATHING_DIFFICULTY: NO
ADLS_ACUITY_SCORE: 7
ADLS_ACUITY_SCORE: 3
ADLS_ACUITY_SCORE: 7
ADLS_ACUITY_SCORE: 3
ADLS_ACUITY_SCORE: 7
ADLS_ACUITY_SCORE: 3

## 2021-10-25 ASSESSMENT — MIFFLIN-ST. JEOR: SCORE: 1902.13

## 2021-10-25 NOTE — ED NOTES
[facility] ED Handoff Report    ED Chief Complaint: Abdominal pain    ED Diagnosis:  (K42.0) Umbilical hernia with obstruction       PMH:  No past medical history on file.     Code Status:  No Order     Falls Risk: No Band: Not applicable    Current Living Situation/Residence: lives with a significant other     Elimination Status: Continent: Yes     Activity Level: Independent    Patients Preferred Language:  English     Needed: No    Vital Signs:  /58   Pulse 70   Temp 98  F (36.7  C) (Oral)   Resp 16   Wt 106.6 kg (235 lb)   SpO2 93%      Pain Score: 3/10 morphine given    Is the Patient Confused:  No    Last Food or Drink: 10/24/21 at PTA    Focused Assessment:  8:02 PM I met with the patient for the initial interview and physical examination. Discussed plan for treatment and workup in the ED.    8:17 PM Spoke with Dr. Kaufman, general surgery.   9:23 PM.  White cell count minimally elevated at 12.3.  Hemoglobin normal.  Patient with mild renal insufficiency with BUN of 28 creatinine 1.82.  Calcium slightly elevated at 10.9.  Liver function tests normal.  Will proceed with CT imaging.  Pepcid and Zofran also ordered for additional symptomatic relief  9:52 PM Updated patient on imaging. Performed a hernia reduction.  The patient's mechanical small bowel obstruction will admit overnight to ensure turn of bowel function.  10:25 PM Spoke with , hospitalist, over patient's admission  At the conclusion of the encounter I discussed the results of all of the tests and the disposition. The questions were answered and return precautions provided. The patient or family acknowledged understanding and was agreeable with the care plan.     Tests Performed: Done: Labs and Imaging    Treatments Provided:  Morphine, pepcid, fluids and zofran    Family Dynamics/Concerns: No    Family Updated On Visitor Policy: Yes    Plan of Care Communicated to Family: Yes    Who Was Updated about Plan of  Care: wife    Belongings Checklist Done and Signed by Patient: Yes    Covid: asymptomatic , pending    Additional Information: none    RN: Susy Barry   10/24/2021 10:35 PM

## 2021-10-25 NOTE — CONSULTS
Care Management Initial Consult    General Information  Assessment completed with: Patient,    Type of CM/SW Visit: Initial Assessment    Primary Care Provider verified and updated as needed:     Readmission within the last 30 days: no previous admission in last 30 days      Reason for Consult: discharge planning  Advance Care Planning:            Communication Assessment  Patient's communication style: spoken language (English or Bilingual)    Hearing Difficulty or Deaf: no   Wear Glasses or Blind: no    Cognitive  Cognitive/Neuro/Behavioral: WDL                      Living Environment:   People in home: spouse     Current living Arrangements:        Able to return to prior arrangements: yes       Family/Social Support:  Care provided by:    Provides care for:                  Description of Support System:           Current Resources:   Patient receiving home care services: No     Community Resources: None  Equipment currently used at home: none  Supplies currently used at home: None    Employment/Financial:  Employment Status:          Financial Concerns:             Lifestyle & Psychosocial Needs:  Social Determinants of Health     Tobacco Use: Medium Risk     Smoking Tobacco Use: Former Smoker     Smokeless Tobacco Use: Never Used   Alcohol Use:      Frequency of Alcohol Consumption:      Average Number of Drinks:      Frequency of Binge Drinking:    Financial Resource Strain:      Difficulty of Paying Living Expenses:    Food Insecurity:      Worried About Running Out of Food in the Last Year:      Ran Out of Food in the Last Year:    Transportation Needs:      Lack of Transportation (Medical):      Lack of Transportation (Non-Medical):    Physical Activity:      Days of Exercise per Week:      Minutes of Exercise per Session:    Stress:      Feeling of Stress :    Social Connections:      Frequency of Communication with Friends and Family:      Frequency of Social Gatherings with Friends and Family:       Attends Zoroastrianism Services:      Active Member of Clubs or Organizations:      Attends Club or Organization Meetings:      Marital Status:    Intimate Partner Violence:      Fear of Current or Ex-Partner:      Emotionally Abused:      Physically Abused:      Sexually Abused:    Depression:      PHQ-2 Score:    Housing Stability:      Unable to Pay for Housing in the Last Year:      Number of Places Lived in the Last Year:      Unstable Housing in the Last Year:        Functional Status:  Prior to admission patient needed assistance:   Dependent ADLs:: Independent  Dependent IADLs:: Independent  Assesssment of Functional Status: At functional baseline    Mental Health Status:          Chemical Dependency Status:                Values/Beliefs:  Spiritual, Cultural Beliefs, Zoroastrianism Practices, Values that affect care:                 Additional Information:    Pt lives with spouse in a private residence. He is independent with all ADL's, has no services and no DME used.     Anticipate pt will DC back home without needs. Family will transport upon DC. Informed that CM will follow progression of care.   Mayda Fernandes RN, CM, XENA

## 2021-10-25 NOTE — ANESTHESIA PROCEDURE NOTES
Airway       Patient location during procedure: OR       Procedure Start/Stop Times: 10/25/2021 6:26 PM  Staff -        Anesthesiologist:  Bon Parker MD       CRNA: Tiffani Webb APRN CRNA       Performed By: CRNA  Consent for Airway        Urgency: elective  Indications and Patient Condition       Indications for airway management: sejal-procedural       Induction type:RSI       Mask difficulty assessment: 0 - not attempted    Final Airway Details       Final airway type: endotracheal airway       Successful airway: ETT - single  Endotracheal Airway Details        ETT size (mm): 8.0       Cuffed: yes       Successful intubation technique: direct laryngoscopy       DL Blade Type: [place] 2       Grade View of Cords: 1       Adjucts: stylet and tooth guard       Position: Right       Measured from: gums/teeth       Secured at (cm): 20       Bite block used: None    Post intubation assessment        Placement verified by: capnometry, equal breath sounds and chest rise        Number of attempts at approach: 1       Secured with: silk tape       Ease of procedure: easy       Dentition: Intact and Unchanged

## 2021-10-25 NOTE — UTILIZATION REVIEW
Admission Status; Secondary Review Determination   Under the authority of the Utilization Management Committee, the utilization review process indicated a secondary review on Leon Gresham. The review outcome is based on review of the medical records, discussions with staff, and applying clinical experience noted on the date of the review.   (x) Inpatient Status Appropriate - This patient's medical care is consistent with medical management for inpatient care and reasonable inpatient medical practice.     RATIONALE FOR DETERMINATION   56 yr old male presented to ER with abdominal pain.  Noted ventral hernia which was reduced in ER however also with hernia containing small bowel and source of obstruction.  Unfortunately despite reduction, ongoing bilious emesis despite working on SBFT.  NPO currently.  RICH and elevated Cr secondary to volume depletion.  BP low on presentation 86/50.  Still with elevated Cr.     At the time of admission with the information available to the attending physician more than 2 nights Hospital complex care was anticipated, based on patient risk of adverse outcome if treated as outpatient and complex care required. Inpatient admission is appropriate based on the Medicare guidelines.   The information on this document is developed by the utilization review team in order for the business office to ensure compliance. This only denotes the appropriateness of proper admission status and does not reflect the quality of care rendered.   The definitions of Inpatient Status and Observation Status used in making the determination above are those provided in the CMS Coverage Manual, Chapter 1 and Chapter 6, section 70.4.   Sincerely,   Marcella Lopez MD  Utilization Review  Physician Advisor  [facility]

## 2021-10-25 NOTE — CONSULTS
General surgery consult         ASSESSMENT     1. Umbilical hernia with obstruction             PLAN      Umbilical Hernia Repair. I will likely not use mesh if there is any sign of bowel compromise or bowel stress.         CHIEF COMPLAINT     Chief Complaint   Patient presents with     Abdominal Pain     Hernia         HPI     Leon Gresham is a 56 year old male who presents with a 3-day history of abdominal pain at his known umbilical hernia site accompanied with nausea and vomiting.  A gastrogaffin challenge was attempted earlier today but the pt just vomited again.  An NGT has been placed.  His umbilicus is somewhat erythematous and mildly tender to palpation.  It is not clear to me that I am completely able to reduce this hernia.       Patient had a small hard bowel movement yesterday, but cannot remember the last time he passed flatus.  Patient denies fever, chills, shortness of breath, chest pain, melena, hematemesis, urinary changes, rashes and lesions.    The pt also has HTN and hypothyroidism.         PAST MEDICAL HISTORY SURGICAL HISTORY     No past medical history on file. No past surgical history on file.       CURRENT MEDICATIONS ALLERGIES     No current outpatient medications on file.    No Known Allergies       FAMILY HISTORY   No family history on file.      SOCIAL HISTORY     Social History     Socioeconomic History     Marital status: Single     Spouse name: Not on file     Number of children: Not on file     Years of education: Not on file     Highest education level: Not on file   Occupational History     Not on file   Tobacco Use     Smoking status: Former Smoker     Smokeless tobacco: Never Used   Substance and Sexual Activity     Alcohol use: Not on file     Drug use: Not on file     Sexual activity: Not on file   Other Topics Concern     Not on file   Social History Narrative     Not on file     Social Determinants of Health     Financial Resource Strain:      Difficulty of Paying Living  "Expenses:    Food Insecurity:      Worried About Running Out of Food in the Last Year:      Ran Out of Food in the Last Year:    Transportation Needs:      Lack of Transportation (Medical):      Lack of Transportation (Non-Medical):    Physical Activity:      Days of Exercise per Week:      Minutes of Exercise per Session:    Stress:      Feeling of Stress :    Social Connections:      Frequency of Communication with Friends and Family:      Frequency of Social Gatherings with Friends and Family:      Attends [employer] Services:      Active Member of Clubs or Organizations:      Attends Club or Organization Meetings:      Marital Status:    Intimate Partner Violence:      Fear of Current or Ex-Partner:      Emotionally Abused:      Physically Abused:      Sexually Abused:          REVIEW OF SYSTEMS       12 point review of systems are unremarkable except for the symptoms described in the HPI    PHYSICAL EXAM     VITAL SIGNS: /77 (BP Location: Left arm)   Pulse 79   Temp 98.7  F (37.1  C) (Oral)   Resp 18   Ht 1.803 m (5' 11\")   Wt 106 kg (233 lb 11 oz)   SpO2 90%   BMI 32.59 kg/m     General : Alert, cooperative, appears stated age   Skin: Skin color, texture, turgor normal, no rashes or lesions   Lymph nodes: No obvious adenopathy   Head: Normocephalic, without obvious abnormality, atraumatic   HEENT:  NGT in place. conjunctiva/corneas clear, EOM's intact, no scleral icterus   Throat: Lips, mucosa, and tongue normal; teeth and gums normal    Neck: Supple, thyroid not enlarged   Back: No CVA tenderness   Lungs: Clear to auscultation bilaterally, respirations unlabored, no rales, rhonchi or wheezes   Chest Wall:No tenderness or deformity   Heart: Regular rate and rhythm, S1, S2 normal, no murmur, rub or gallop   Abdomen: Soft, Non-tender, no guarding, Umbilical Hernia that I cannot fully reduce. + bowel sounds active,   Extremities : No obvious swelling,  Neurologic: Cranial Nerves II-XII normal, moves " all extremities equally, no focal findings    RADIOLOGY      CT Abdomen Pelvis w Contrast   Final Result   IMPRESSION:    1.  There is a small periumbilical hernia containing a short segment of small bowel. There is resultant mechanical small bowel obstruction secondary to this hernia. Incarcerated hernia not excluded.      2.  Small amounts of scattered free fluid. No free air.      3.  No significant findings elsewhere.      XR Gastrografin  Challenge    (Results Pending)       EKG     Reviewed        LABS     Results for orders placed or performed during the hospital encounter of 10/24/21   CT Abdomen Pelvis w Contrast    Impression    IMPRESSION:   1.  There is a small periumbilical hernia containing a short segment of small bowel. There is resultant mechanical small bowel obstruction secondary to this hernia. Incarcerated hernia not excluded.    2.  Small amounts of scattered free fluid. No free air.    3.  No significant findings elsewhere.   Comprehensive metabolic panel   Result Value Ref Range    Sodium 140 136 - 145 mmol/L    Potassium 3.9 3.5 - 5.0 mmol/L    Chloride 94 (L) 98 - 107 mmol/L    Carbon Dioxide (CO2) 30 22 - 31 mmol/L    Anion Gap 16 5 - 18 mmol/L    Urea Nitrogen 28 (H) 8 - 22 mg/dL    Creatinine 1.82 (H) 0.70 - 1.30 mg/dL    Calcium 10.9 (H) 8.5 - 10.5 mg/dL    Glucose 120 70 - 125 mg/dL    Alkaline Phosphatase 99 45 - 120 U/L    AST 19 0 - 40 U/L    ALT 23 0 - 45 U/L    Protein Total 7.7 6.0 - 8.0 g/dL    Albumin 4.2 3.5 - 5.0 g/dL    Bilirubin Total 1.1 (H) 0.0 - 1.0 mg/dL    GFR Estimate 40 (L) >60 mL/min/1.73m2   Result Value Ref Range    Lipase 29 0 - 52 U/L   Lactic acid whole blood   Result Value Ref Range    Lactic Acid 1.3 0.7 - 2.0 mmol/L   CBC (+ platelets, no diff)   Result Value Ref Range    WBC Count 12.3 (H) 4.0 - 11.0 10e3/uL    RBC Count 5.42 4.40 - 5.90 10e6/uL    Hemoglobin 17.0 13.3 - 17.7 g/dL    Hematocrit 49.9 40.0 - 53.0 %    MCV 92 78 - 100 fL    MCH 31.4 26.5 -  33.0 pg    MCHC 34.1 31.5 - 36.5 g/dL    RDW 13.5 10.0 - 15.0 %    Platelet Count 253 150 - 450 10e3/uL   Asymptomatic COVID-19 Virus (Coronavirus) by PCR Nasopharyngeal    Specimen: Nasopharyngeal; Swab   Result Value Ref Range    SARS CoV2 PCR Negative Negative   Basic metabolic panel   Result Value Ref Range    Sodium 139 136 - 145 mmol/L    Potassium 4.0 3.5 - 5.0 mmol/L    Chloride 101 98 - 107 mmol/L    Carbon Dioxide (CO2) 29 22 - 31 mmol/L    Anion Gap 9 5 - 18 mmol/L    Urea Nitrogen 27 (H) 8 - 22 mg/dL    Creatinine 1.38 (H) 0.70 - 1.30 mg/dL    Calcium 9.2 8.5 - 10.5 mg/dL    Glucose 94 70 - 125 mg/dL    GFR Estimate 56 (L) >60 mL/min/1.73m2   Lactic acid whole blood   Result Value Ref Range    Lactic Acid 0.7 0.7 - 2.0 mmol/L   Extra Purple Top Tube   Result Value Ref Range    Hold Specimen JIC    Result Value Ref Range    Magnesium 2.2 1.8 - 2.6 mg/dL           [facility]  [phone]

## 2021-10-25 NOTE — PLAN OF CARE
Problem: Adult Inpatient Plan of Care  Goal: Optimal Comfort and Wellbeing  Outcome: Improving   Patient up to floor around 1400, continues to report nausea. Surgery saw patient, ordered NG to be placed to LIS. NG placed and immediately got 750cc of brownish-green bile out. Patient did have gagging and emesis with tube placement, but reported feeling better after.

## 2021-10-25 NOTE — CONSULTS
Surgery note:  Chart reviewed, CT scan reviewed, physician notes reviewed.  Reduced umbilical hernia.  Awaiting Gastrografin challenge results.  Surgery to see the patient once Gastrografin challenge complete  Full consult to follow    Kurt Paul DO [facility] Surgery  [phone]

## 2021-10-25 NOTE — CONSULTS
"General Surgery Consultation  Leon Gresham MRN# [id number]   Age/Sex: 56 year old male YOB: 1965     Reason for consult: 1. Umbilical hernia with obstruction            Requesting physician: Kelvin Carbone MD                   Assessment and Plan:   Assessment:  SBO secondary to umbilical hernia that is partially reducible and not strangulated, but is likely chronically incarcerated.  Bowel was not decompressed prior to administration of Gastrografin resulted in vomiting.  Will have NG tube placed to decompress bowel through the night.  Can likely readminister Gastrografin tomorrow morning to make sure that patient's obstruction has cleared.  If abdominal pain increases or patient shows signs of bowel strangulation, we will need to proceed emergently to the OR.  If patient is feeling better tomorrow after decompression and Gastrografin is going through, we can proceed with elective repair of his umbilical hernia.    Plan:  N.p.o.  NG to LIS  Surgery will continue to follow            Chief Complaint:     Chief Complaint   Patient presents with     Abdominal Pain     Hernia        History is obtained from the patient and patient's spouse    HPI:   Leon Gresham is a 56 year old male who presents with a 3-day history of abdominal pain at his known umbilical hernia site accompanied with nausea and vomiting.  Past medical history significant for HTN and hypothyroidism.  Patient states he has had the hernia for \"years\" and has never had any issues in the past.  He began having intermittent nausea with vomiting on 10/22/2021 and this continued over the weekend.  Per his wife he was tolerating liquids, but pain continued to increase yesterday prompting them to seek care at the emergency department.  Patient reports redness beginning around the umbilicus on 10/22/2021 as well.  Patient had a small hard bowel movement yesterday, but cannot remember the last time he passed flatus.  Patient denies fever, chills, " "shortness of breath, chest pain, melena, hematemesis, urinary changes, rashes and lesions.    No NG tube was placed and patient was administered Gastrografin orally.  Patient has begun vomiting violently, since Gastrografin administration.          Past Medical History:   No past medical history on file.           Past Surgical History:   No past surgical history on file.          Social History:    reports that he has quit smoking. He has never used smokeless tobacco.           Family History:   No family history on file.           Allergies:   No Known Allergies           Medications:     Prior to Admission medications    Medication Sig Start Date End Date Taking? Authorizing Provider   HORTENSIA THYROID 240 MG tablet Take 240 mg by mouth daily 9/30/21  Yes Reported, Patient   CALCIUM-MAGNESIUM-ZINC PO Take 3 tablets by mouth every evening   Yes Unknown, Entered By History   lisinopril (ZESTRIL) 10 MG tablet Take 10 mg by mouth daily 8/19/21  Yes Reported, Patient   lysine 500 MG TABS Take 500 mg by mouth 2 times daily   Yes Unknown, Entered By History   MAGNESIUM CITRATE PO Take 1 tablet by mouth daily   Yes Unknown, Entered By History   multivitamin w/minerals (MULTI-VITAMIN) tablet Take 1 tablet by mouth daily   Yes Unknown, Entered By History              Review of Systems:   The Review of Systems is negative other than noted in the HPI            Physical Exam:     Patient Vitals for the past 24 hrs:   BP Temp Temp src Pulse Resp SpO2 Height Weight   10/25/21 1435 126/73 98.5  F (36.9  C) Oral 62 16 92 % 1.803 m (5' 11\") 106 kg (233 lb 11 oz)   10/25/21 1315 133/81 -- -- -- -- 92 % -- --   10/25/21 1300 108/63 -- -- 70 -- 95 % -- --   10/25/21 1200 124/76 -- -- 64 -- 94 % -- --   10/25/21 1100 124/79 -- -- 60 -- 94 % -- --   10/25/21 1000 122/78 -- -- 58 -- 95 % -- --   10/25/21 0900 110/74 -- -- 81 -- 90 % -- --   10/25/21 0800 102/66 -- -- 59 -- 93 % -- --   10/25/21 0701 -- -- -- -- 16 -- -- --   10/25/21 " 0700 106/69 -- -- 61 -- 91 % -- --   10/25/21 0645 -- -- -- 62 -- 95 % -- --   10/25/21 0630 -- -- -- 61 -- 95 % -- --   10/25/21 0615 -- -- -- 64 -- 95 % -- --   10/25/21 0601 -- -- -- -- 16 95 % -- --   10/25/21 0600 (!) 86/50 -- -- 59 -- 96 % -- --   10/25/21 0545 -- -- -- 59 -- 96 % -- --   10/25/21 0530 -- -- -- 71 -- 97 % -- --   10/25/21 0515 -- -- -- 63 -- 94 % -- --   10/25/21 0500 95/52 -- -- 60 16 95 % -- --   10/25/21 0445 -- -- -- 67 -- 93 % -- --   10/25/21 0430 -- -- -- 66 -- 93 % -- --   10/25/21 0415 -- -- -- 63 -- 93 % -- --   10/25/21 0400 94/50 -- -- 66 16 92 % -- --   10/25/21 0300 116/79 -- -- 63 -- 95 % -- --   10/25/21 0200 111/59 -- -- 70 -- 96 % -- --   10/25/21 0120 -- -- -- 75 -- 92 % -- --   10/25/21 0119 -- -- -- 76 -- 91 % -- --   10/25/21 0118 -- -- -- 77 -- 91 % -- --   10/25/21 0117 -- -- -- 77 -- 95 % -- --   10/25/21 0116 -- -- -- 79 -- 95 % -- --   10/25/21 0115 -- -- -- 77 -- 98 % -- --   10/25/21 0114 -- -- -- 76 -- 97 % -- --   10/25/21 0113 -- -- -- 75 -- 95 % -- --   10/25/21 0112 -- -- -- 74 -- 95 % -- --   10/25/21 0111 -- -- -- 74 -- 95 % -- --   10/25/21 0110 -- -- -- 74 -- 96 % -- --   10/25/21 0109 -- -- -- 74 -- 96 % -- --   10/25/21 0108 -- -- -- 74 -- 96 % -- --   10/25/21 0107 -- -- -- 72 -- 96 % -- --   10/25/21 0106 -- -- -- 73 -- 96 % -- --   10/25/21 0105 -- -- -- 74 -- 96 % -- --   10/25/21 0104 -- -- -- 73 -- 96 % -- --   10/25/21 0103 -- -- -- 73 -- 96 % -- --   10/25/21 0102 -- -- -- 71 -- 95 % -- --   10/25/21 0101 -- -- -- 66 -- 95 % -- --   10/25/21 0100 110/57 -- -- 68 -- 96 % -- --   10/25/21 0059 -- -- -- 72 -- 95 % -- --   10/25/21 0058 -- -- -- 73 -- 95 % -- --   10/25/21 0057 -- -- -- 73 -- 95 % -- --   10/25/21 0056 -- -- -- 72 -- 96 % -- --   10/25/21 0055 -- -- -- 73 -- 96 % -- --   10/25/21 0054 -- -- -- 74 -- 96 % -- --   10/25/21 0053 -- -- -- 73 -- 96 % -- --   10/25/21 0052 -- -- -- 72 -- 96 % -- --   10/25/21 0051 -- -- -- 73 -- 96 % -- --    10/25/21 0050 -- -- -- 72 -- 96 % -- --   10/25/21 0049 -- -- -- 71 -- 96 % -- --   10/25/21 0048 -- -- -- 65 -- 96 % -- --   10/25/21 0047 -- -- -- 67 -- 96 % -- --   10/25/21 0046 -- -- -- 66 -- 96 % -- --   10/25/21 0045 -- -- -- 65 -- 96 % -- --   10/25/21 0044 -- -- -- 68 -- 96 % -- --   10/25/21 0043 -- -- -- 71 -- 96 % -- --   10/25/21 0042 -- -- -- 70 -- 96 % -- --   10/25/21 0041 -- -- -- 73 -- 96 % -- --   10/25/21 0040 -- -- -- 69 -- 95 % -- --   10/25/21 0039 -- -- -- 70 -- 95 % -- --   10/25/21 0038 -- -- -- 71 -- 95 % -- --   10/25/21 0037 -- -- -- 69 -- 95 % -- --   10/25/21 0036 -- -- -- 69 -- 95 % -- --   10/25/21 0035 -- -- -- 67 -- 94 % -- --   10/25/21 0034 -- -- -- 64 -- 94 % -- --   10/25/21 0033 -- -- -- 65 -- 93 % -- --   10/25/21 0032 -- -- -- 64 -- 93 % -- --   10/25/21 0031 -- -- -- 65 -- 93 % -- --   10/25/21 0030 120/61 -- -- 66 -- 95 % -- --   10/25/21 0029 -- -- -- 63 -- 97 % -- --   10/24/21 2232 106/58 -- -- 70 16 93 % -- --   10/24/21 2115 -- -- -- 75 16 93 % -- --   10/24/21 1830 (!) 142/78 98  F (36.7  C) Oral 98 18 97 % -- 106.6 kg (235 lb)          Intake/Output Summary (Last 24 hours) at 10/25/2021 1515  Last data filed at 10/25/2021 1500  Gross per 24 hour   Intake 1000 ml   Output 800 ml   Net 200 ml      Constitutional:   awake, alert, cooperative, no apparent distress, and appears stated age; visibly uncomfortable       Eyes:   PERRL, conjunctiva/corneas clear, EOM's intact; no scleral edema or icterus noted        ENT:   Normocephalic, without obvious abnormality, atraumatic, Lips, mucosa, and tongue normal        Lungs:   Normal respiratory effort, no accessory muscle use       Cardiovascular:   Regular rate and rhythm       Abdomen:   Soft, obese, protuberant, with firm umbilical hernia noted surrounded with approximately 3 to 4 cm of erythema in all directions; hernia manipulated and able to be reduced; able to palpate distal edge of hernia defect but not able to  confidently identify proximal edge of the defect suggesting the hernia is not fully reducible       Musculoskeletal:   No obvious swelling, bruising or deformity       Skin:   Skin color and texture normal for patient, no rashes or lesions              Data:         All imaging studies reviewed by me.    Results for orders placed or performed during the hospital encounter of 10/24/21 (from the past 24 hour(s))   Comprehensive metabolic panel   Result Value Ref Range    Sodium 140 136 - 145 mmol/L    Potassium 3.9 3.5 - 5.0 mmol/L    Chloride 94 (L) 98 - 107 mmol/L    Carbon Dioxide (CO2) 30 22 - 31 mmol/L    Anion Gap 16 5 - 18 mmol/L    Urea Nitrogen 28 (H) 8 - 22 mg/dL    Creatinine 1.82 (H) 0.70 - 1.30 mg/dL    Calcium 10.9 (H) 8.5 - 10.5 mg/dL    Glucose 120 70 - 125 mg/dL    Alkaline Phosphatase 99 45 - 120 U/L    AST 19 0 - 40 U/L    ALT 23 0 - 45 U/L    Protein Total 7.7 6.0 - 8.0 g/dL    Albumin 4.2 3.5 - 5.0 g/dL    Bilirubin Total 1.1 (H) 0.0 - 1.0 mg/dL    GFR Estimate 40 (L) >60 mL/min/1.73m2   Lipase   Result Value Ref Range    Lipase 29 0 - 52 U/L   Lactic acid whole blood   Result Value Ref Range    Lactic Acid 1.3 0.7 - 2.0 mmol/L   CBC (+ platelets, no diff)   Result Value Ref Range    WBC Count 12.3 (H) 4.0 - 11.0 10e3/uL    RBC Count 5.42 4.40 - 5.90 10e6/uL    Hemoglobin 17.0 13.3 - 17.7 g/dL    Hematocrit 49.9 40.0 - 53.0 %    MCV 92 78 - 100 fL    MCH 31.4 26.5 - 33.0 pg    MCHC 34.1 31.5 - 36.5 g/dL    RDW 13.5 10.0 - 15.0 %    Platelet Count 253 150 - 450 10e3/uL   CT Abdomen Pelvis w Contrast    Narrative    EXAM: CT ABDOMEN PELVIS W CONTRAST  LOCATION: [facility]  DATE/TIME: 10/24/2021 9:26 PM    INDICATION: Abdominal distension.  COMPARISON: None.  TECHNIQUE: CT scan of the abdomen and pelvis was performed following injection of IV contrast. Multiplanar reformats were obtained. Dose reduction techniques were used.  CONTRAST: Isovue 370 75 ml.    FINDINGS:   LOWER  CHEST: Normal.    HEPATOBILIARY: Normal.    PANCREAS: Normal.    SPLEEN: Normal.    ADRENAL GLANDS: Normal.    KIDNEYS/BLADDER: Low-attenuation subcentimeter renal lesion(s). These are compatible with small benign cysts and no specific imaging evaluation or follow-up is recommended. Kidneys are otherwise negative. No hydronephrosis. Bladder is unremarkable.    BOWEL: There is a small periumbilical hernia present containing a short segment of mid small bowel. There is resultant mechanical obstruction secondary to this hernia with moderate fluid distention of loops of proximal small bowel and decompression of   loops of distal small bowel. Incarcerated hernia not excluded. No free air. No significant bowel findings elsewhere.    LYMPH NODES: Normal.    VASCULATURE: Unremarkable.    PELVIC ORGANS: Normal.    ADDITIONAL FINDINGS: Minimal amount of free fluid in the pelvis.    MUSCULOSKELETAL: Minimal degenerative changes in the spine. Benign-appearing lucent lesion in the right hip with some chondroid matrix most likely reflecting a benign enchondroma.      Impression    IMPRESSION:   1.  There is a small periumbilical hernia containing a short segment of small bowel. There is resultant mechanical small bowel obstruction secondary to this hernia. Incarcerated hernia not excluded.    2.  Small amounts of scattered free fluid. No free air.    3.  No significant findings elsewhere.   Hernia Reduction    Narrative    Gus Nash MD     10/24/2021 10:28 PM  [facility]    Procedure: Hernia Reduction    Date/Time: 10/24/2021 9:52 PM  Performed by: Gus Nash MD  Authorized by: Gus Nash MD     PROCEDURE   Patient Tolerance:  Patient tolerated the procedure well with no immediate   complications     Asymptomatic COVID-19 Virus (Coronavirus) by PCR Nasopharyngeal    Specimen: Nasopharyngeal; Swab   Result Value Ref Range    SARS CoV2 PCR Negative Negative    Narrative    Testing was  performed using the jared  SARS-CoV-2 & Influenza A/B Assay on the jared  Nanette  System.  This test should be ordered for the detection of SARS-COV-2 in individuals who meet SARS-CoV-2 clinical and/or epidemiological criteria. Test performance is unknown in asymptomatic patients.  This test is for in vitro diagnostic use under the FDA EUA for laboratories certified under CLIA to perform moderate and/or high complexity testing. This test has not been FDA cleared or approved.  A negative test does not rule out the presence of PCR inhibitors in the specimen or target RNA in concentration below the limit of detection for the assay. The possibility of a false negative should be considered if the patient's recent exposure or clinical presentation suggests COVID-19.  [facility] [employer] are certified under the Clinical Laboratory Improvement Amendments of 1988 (CLIA-88) as qualified to perform moderate and/or high complexity laboratory testing.   Surgery General IP Consult: Patient to be seen: Routine within 24 hrs; sbo, hernia; Consultant may enter orders: Yes; Requesting provider? Hospitalist (if different from attending physician); Which goup will follow this patient? [facility] ...    Narrative    Jerome Paul DO     10/25/2021  1:53 PM  Surgery note:  Chart reviewed, CT scan reviewed, physician notes reviewed.  Reduced umbilical hernia.  Awaiting Gastrografin challenge   results.  Surgery to see the patient once Gastrografin challenge complete  Full consult to follow    Kurt Paul DO [facility]  [phone]     Basic metabolic panel   Result Value Ref Range    Sodium 139 136 - 145 mmol/L    Potassium 4.0 3.5 - 5.0 mmol/L    Chloride 101 98 - 107 mmol/L    Carbon Dioxide (CO2) 29 22 - 31 mmol/L    Anion Gap 9 5 - 18 mmol/L    Urea Nitrogen 27 (H) 8 - 22 mg/dL    Creatinine 1.38 (H) 0.70 - 1.30 mg/dL    Calcium 9.2 8.5 - 10.5 mg/dL    Glucose 94 70 - 125 mg/dL    GFR Estimate  56 (L) >60 mL/min/1.73m2   Lactic acid whole blood   Result Value Ref Range    Lactic Acid 0.7 0.7 - 2.0 mmol/L   Extra Tube ([place] Draw)    Narrative    The following orders were created for panel order Extra Tube ([place] Draw).  Procedure                               Abnormality         Status                     ---------                               -----------         ------                     Extra Purple Top Tube[[id number]]                            Final result                 Please view results for these tests on the individual orders.   Extra Purple Top Tube   Result Value Ref Range    Hold Specimen JIC    Magnesium   Result Value Ref Range    Magnesium 2.2 1.8 - 2.6 mg/dL   Social Work/ Care Management IP Consult    Narrative    Mayda Fernandes RN     10/25/2021 10:58 AM  Care Management Initial Consult    General Information  Assessment completed with: Patient,    Type of CM/SW Visit: Initial Assessment    Primary Care Provider verified and updated as needed:     Readmission within the last 30 days: no previous admission in   last 30 days      Reason for Consult: discharge planning  Advance Care Planning:            Communication Assessment  Patient's communication style: spoken language (English or   Bilingual)    Hearing Difficulty or Deaf: no   Wear Glasses or Blind: no    Cognitive  Cognitive/Neuro/Behavioral: WDL                      Living Environment:   People in home: spouse     Current living Arrangements:        Able to return to prior arrangements: yes       Family/Social Support:  Care provided by:    Provides care for:                  Description of Support System:           Current Resources:   Patient receiving home care services: No     Community Resources: None  Equipment currently used at home: none  Supplies currently used at home: None    Employment/Financial:  Employment Status:          Financial Concerns:             Lifestyle & Psychosocial Needs:  Social Determinants  of Health     Tobacco Use: Medium Risk     Smoking Tobacco Use: Former Smoker     Smokeless Tobacco Use: Never Used   Alcohol Use:      Frequency of Alcohol Consumption:      Average Number of Drinks:      Frequency of Binge Drinking:    Financial Resource Strain:      Difficulty of Paying Living Expenses:    Food Insecurity:      Worried About Running Out of Food in the Last Year:      Ran Out of Food in the Last Year:    Transportation Needs:      Lack of Transportation (Medical):      Lack of Transportation (Non-Medical):    Physical Activity:      Days of Exercise per Week:      Minutes of Exercise per Session:    Stress:      Feeling of Stress :    Social Connections:      Frequency of Communication with Friends and Family:      Frequency of Social Gatherings with Friends and Family:      Attends Alevism Services:      Active Member of Clubs or Organizations:      Attends Club or Organization Meetings:      Marital Status:    Intimate Partner Violence:      Fear of Current or Ex-Partner:      Emotionally Abused:      Physically Abused:      Sexually Abused:    Depression:      PHQ-2 Score:    Housing Stability:      Unable to Pay for Housing in the Last Year:      Number of Places Lived in the Last Year:      Unstable Housing in the Last Year:        Functional Status:  Prior to admission patient needed assistance:   Dependent ADLs:: Independent  Dependent IADLs:: Independent  Assesssment of Functional Status: At functional baseline    Mental Health Status:          Chemical Dependency Status:                Values/Beliefs:  Spiritual, Cultural Beliefs, Alevism Practices, Values that   affect care:                 Additional Information:    Pt lives with spouse in a private residence. He is independent   with all ADL's, has no services and no DME used.     Anticipate pt will DC back home without needs. Family will   transport upon DC. Informed that CM will follow progression of   care.   Mayda Fernandes RN,  CM, XENA Landon, RONN CNP

## 2021-10-25 NOTE — H&P
[facility]    History and Physical - Hospitalist Service       Date of Admission:  10/24/2021    Assessment & Plan      Leon Gresham is a 56 year old male admitted on 10/24/2021. He came to the emergency department from the clinic for evaluation of abdominal pain.    1.  Mechanical small bowel obstruction  CT abdomen and pelvis showed a small periumbilical hernia containing a short segment of small bowel  Known umbilical hernia without previous abdominal surgeries  Reduced by ED provider  Lactic acid 1.3  Supportive management  Pain control  Insert NG tube if recurrence of nausea and vomiting  Small bowel follow-through  General surgery consult    2.  Acute kidney injury  IV hydration  Avoid nephrotoxins    3.  Hypercalcemia, mild  Secondary to volume depletion likely  Recheck in a.m.    4.  Essential hypertension  Blood pressure is soft at this time  Hold lisinopril and monitor    5.  Hypothyroidism  On thyroid [place], resume when taking by mouth    6.  Moderate severe alcohol use  2 alcoholic beverages nightly  Denies previous history of withdrawals, will monitor     Diet: NPO for Medical/Clinical Reasons Except for: No Exceptions    DVT Prophylaxis: Pneumatic Compression Devices  Olson Catheter: Not present  Central Lines: None  Code Status: Full Code      Clinically Significant Risk Factors Present on Admission          # Hypercalcemia: Ca = 10.9 mg/dL (Ref range: 8.5 - 10.5 mg/dL) and/or iCa = N/A on admission, will monitor as appropriate          Disposition Plan   Expected discharge:  at least 2 midnights recommended to prior living arrangement once adequate pain management/ tolerating PO medications.     The patient's care was discussed with the Patient.    Kelvin Carbone MD  [facility]  Securely message with the Vocera Web Console (learn more here)  Text page via [employer]  Paging/Directory      ______________________________________________________________________    Chief Complaint   Abdominal pain, vomiting    History is obtained from the patient, electronic health record and emergency department physician    History of Present Illness   Leon Gresham is a 56 year old male who was sent to the emergency department from the clinic for evaluation of abdominal pain and umbilical hernia.  Past medical history significant for hypertension, hypothyroidism, umbilical hernia.  Patient has no previous surgeries.  He felt constipated since October 22, 2021 and had vomiting on October 23, 2021 which he attributes to eating a hot dogs at a gas station on the previous day.  He felt sick and laid in bed most of the day and then had a bowel movement late morning on October 24, 2021.  He then felt pain around his bellybutton and family became concerned because the hernia looked red and swollen.  Patient complained of significant amount of abdominal pain so he went to the clinic where he was evaluated.  He feels the pain is less after the hernia was reduced in the ED.  He denies tobacco or illicit drugs.  He drinks 2 alcoholic beverages daily.    Review of Systems    The 10 point Review of Systems is negative other than noted in the HPI or here.     Past Medical History    I have reviewed this patient's medical history and updated it with pertinent information if needed.   No past medical history on file.    Past Surgical History   I have reviewed this patient's surgical history and updated it with pertinent information if needed.  No past surgical history on file.    Social History   I have reviewed this patient's social history and updated it with pertinent information if needed.  Social History     Tobacco Use     Smoking status: Former Smoker     Smokeless tobacco: Never Used   Substance Use Topics     Alcohol use: Not on file     Drug use: Not on file       Family History     No significant  family history, including no history of:     Prior to Admission Medications   Prior to Admission Medications   Prescriptions Last Dose Informant Patient Reported? Taking?   HORTENSIA THYROID 240 MG tablet 10/24/2021  Yes Yes   Sig: Take 240 mg by mouth daily   CALCIUM-MAGNESIUM-ZINC PO 10/23/2021 at pm  Yes Yes   Sig: Take 3 tablets by mouth every evening   MAGNESIUM CITRATE PO 10/23/2021 at am  Yes Yes   Sig: Take 1 tablet by mouth daily   lisinopril (ZESTRIL) 10 MG tablet 10/24/2021  Yes Yes   Sig: Take 10 mg by mouth daily   lysine 500 MG TABS 10/23/2021 at pm  Yes Yes   Sig: Take 500 mg by mouth 2 times daily   multivitamin w/minerals (MULTI-VITAMIN) tablet 10/23/2021 at am  Yes Yes   Sig: Take 1 tablet by mouth daily      Facility-Administered Medications: None     Allergies   No Known Allergies    Physical Exam   Vital Signs: Temp: 98  F (36.7  C) Temp src: Oral BP: 116/79 Pulse: 63   Resp: 16 SpO2: 95 % O2 Device: Nasal cannula    Weight: 235 lbs 0 oz    Constitutional: awake, alert, cooperative, no apparent distress, and appears stated age  Respiratory: No increased work of breathing, good air exchange, clear to auscultation bilaterally, no crackles or wheezing  Cardiovascular: regular rate and rhythm and normal S1 and S2  GI: hypoactive bowel sounds, firm, distended and tenderness noted in the periumbilical area  Skin: no redness, warmth, or swelling  Musculoskeletal: no lower extremity pitting edema present  tone is normal  Neurologic: Mental Status Exam:  Level of Alertness:   awake  Orientation:   person, place, time  Motor Exam:  moves all extremities well and symmetrically  Neuropsychiatric: Affect: normal    Data   Data reviewed today: I reviewed all medications, new labs and imaging results over the last 24 hours. I personally reviewed no images or EKG's today.    Recent Labs   Lab 10/24/21  2033   WBC 12.3*   HGB 17.0   MCV 92         POTASSIUM 3.9   CHLORIDE 94*   CO2 30   BUN 28*   CR  1.82*   ANIONGAP 16   SOTO 10.9*      ALBUMIN 4.2   PROTTOTAL 7.7   BILITOTAL 1.1*   ALKPHOS 99   ALT 23   AST 19   LIPASE 29     Recent Results (from the past 24 hour(s))   CT Abdomen Pelvis w Contrast    Narrative    EXAM: CT ABDOMEN PELVIS W CONTRAST  LOCATION: [facility]  DATE/TIME: 10/24/2021 9:26 PM    INDICATION: Abdominal distension.  COMPARISON: None.  TECHNIQUE: CT scan of the abdomen and pelvis was performed following injection of IV contrast. Multiplanar reformats were obtained. Dose reduction techniques were used.  CONTRAST: Isovue 370 75 ml.    FINDINGS:   LOWER CHEST: Normal.    HEPATOBILIARY: Normal.    PANCREAS: Normal.    SPLEEN: Normal.    ADRENAL GLANDS: Normal.    KIDNEYS/BLADDER: Low-attenuation subcentimeter renal lesion(s). These are compatible with small benign cysts and no specific imaging evaluation or follow-up is recommended. Kidneys are otherwise negative. No hydronephrosis. Bladder is unremarkable.    BOWEL: There is a small periumbilical hernia present containing a short segment of mid small bowel. There is resultant mechanical obstruction secondary to this hernia with moderate fluid distention of loops of proximal small bowel and decompression of   loops of distal small bowel. Incarcerated hernia not excluded. No free air. No significant bowel findings elsewhere.    LYMPH NODES: Normal.    VASCULATURE: Unremarkable.    PELVIC ORGANS: Normal.    ADDITIONAL FINDINGS: Minimal amount of free fluid in the pelvis.    MUSCULOSKELETAL: Minimal degenerative changes in the spine. Benign-appearing lucent lesion in the right hip with some chondroid matrix most likely reflecting a benign enchondroma.      Impression    IMPRESSION:   1.  There is a small periumbilical hernia containing a short segment of small bowel. There is resultant mechanical small bowel obstruction secondary to this hernia. Incarcerated hernia not excluded.    2.  Small amounts of scattered free  fluid. No free air.    3.  No significant findings elsewhere.

## 2021-10-25 NOTE — PHARMACY-ADMISSION MEDICATION HISTORY
Pharmacy Note - Admission Medication History    ______________________________________________________________________    Prior To Admission (PTA) med list completed and updated in EMR.       PTA Med List   Medication Sig Last Dose     ARMOUR THYROID 240 MG tablet Take 240 mg by mouth daily 10/24/2021     CALCIUM-MAGNESIUM-ZINC PO Take 3 tablets by mouth every evening 10/23/2021 at pm     lisinopril (ZESTRIL) 10 MG tablet Take 10 mg by mouth daily 10/24/2021     lysine 500 MG TABS Take 500 mg by mouth 2 times daily 10/23/2021 at pm     MAGNESIUM CITRATE PO Take 1 tablet by mouth daily 10/23/2021 at am     multivitamin w/minerals (MULTI-VITAMIN) tablet Take 1 tablet by mouth daily 10/23/2021 at am       Information source(s): [facility]  Method of interview communication: in-person    Summary of Changes to PTA Med List  New: vitamins/supplements  Discontinued: -  Changed: -    Patient was asked about OTC/herbal products specifically.  PTA med list reflects this.    In the past week, patient estimated taking medication this percent of the time:  greater than 90%.    Allergies were reviewed, assessed, and updated with the patient.      Patient does not use any multi-dose medications prior to admission.    The information provided in this note is only as accurate as the sources available at the time of the update(s).    Thank you for the opportunity to participate in the care of this patient.    Rosalba Stafford, PharmD, BCPS 10/24/21 10:26 PM

## 2021-10-25 NOTE — ED NOTES
Pt had a large projectile emesis.  Bile in color.  Pt states emesis came quickly.  Sister in the room.  Bed and gown changed.

## 2021-10-25 NOTE — PROGRESS NOTES
Chart reviewed.  Umbilical hernia reduced in ED.  Ob GG SBFT P.O. given no NG tube in place.  Await results.

## 2021-10-25 NOTE — ED NOTES
Pt's oxygen sats decrease to upper 80s when sleeping.  Oxygen 2 LPM via N/C applied and sats improve to mid 90s.

## 2021-10-25 NOTE — ED NOTES
Pt pulled out IV. Complete bed change and gown change done.  Iv restarted.  Fluids restarted.  Pt up to BR to void.  Pt states has not passed gas since yesterday.  Sats decrease to 87% when falling asleep.  Pt placed on 2L O2 per canula.

## 2021-10-25 NOTE — ANESTHESIA PREPROCEDURE EVALUATION
Anesthesia Pre-Procedure Evaluation    Patient: Leon Gresham   MRN: [id number] : 1965        Preoperative Diagnosis: Umbilical hernia with obstruction [K42.0]    Procedure : Procedure(s):  HERNIORRHAPHY, UMBILICAL, OPEN          Past Medical History:   Diagnosis Date     Hypertension      Thyroid disease       History reviewed. No pertinent surgical history.   No Known Allergies   Social History     Tobacco Use     Smoking status: Former Smoker     Smokeless tobacco: Never Used   Substance Use Topics     Alcohol use: Not on file      Wt Readings from Last 1 Encounters:   10/25/21 106 kg (233 lb 11 oz)        Anesthesia Evaluation            ROS/MED HX  ENT/Pulmonary:       Neurologic:       Cardiovascular:     (+) hypertension-----    METS/Exercise Tolerance:     Hematologic:       Musculoskeletal:       GI/Hepatic:       Renal/Genitourinary:     (+) renal disease,     Endo:     (+) thyroid problem, Obesity,     Psychiatric/Substance Use:       Infectious Disease:       Malignancy:       Other:            Physical Exam    Airway        Mallampati: II   TM distance: > 3 FB   Neck ROM: full     Respiratory Devices and Support         Dental  no notable dental history         Cardiovascular   cardiovascular exam normal          Pulmonary               Other findings: NGT to suction with no output.    OUTSIDE LABS:  CBC:   Lab Results   Component Value Date    WBC 12.3 (H) 10/24/2021    HGB 17.0 10/24/2021    HCT 49.9 10/24/2021     10/24/2021     BMP:   Lab Results   Component Value Date     10/25/2021     10/24/2021    POTASSIUM 4.0 10/25/2021    POTASSIUM 3.9 10/24/2021    CHLORIDE 101 10/25/2021    CHLORIDE 94 (L) 10/24/2021    CO2 29 10/25/2021    CO2 30 10/24/2021    BUN 27 (H) 10/25/2021    BUN 28 (H) 10/24/2021    CR 1.38 (H) 10/25/2021    CR 1.82 (H) 10/24/2021    GLC 94 10/25/2021     10/24/2021     COAGS: No results found for: PTT, INR, FIBR  POC: No results found for: BGM,  HCG, HCGS  HEPATIC:   Lab Results   Component Value Date    ALBUMIN 4.2 10/24/2021    PROTTOTAL 7.7 10/24/2021    ALT 23 10/24/2021    AST 19 10/24/2021    ALKPHOS 99 10/24/2021    BILITOTAL 1.1 (H) 10/24/2021     OTHER:   Lab Results   Component Value Date    LACT 0.7 10/25/2021    SOTO 9.2 10/25/2021    MAG 2.2 10/25/2021    LIPASE 29 10/24/2021    TSH 2.51 10/11/2021    T4 0.86 08/27/2021       Anesthesia Plan    ASA Status:  2, emergent    NPO Status:  NPO Appropriate    Anesthesia Type: General.     - Airway: ETT   Induction: Intravenous, RSI.   Maintenance: Balanced.        Consents    Anesthesia Plan(s) and associated risks, benefits, and realistic alternatives discussed. Questions answered and patient/representative(s) expressed understanding.     - Discussed with:  Patient      - Extended Intubation/Ventilatory Support Discussed: No.      - Patient is DNR/DNI Status: No    Use of blood products discussed: Yes.     - Discussed with: Patient.     - Consented: consented to blood products            Reason for refusal: other.     Postoperative Care    Pain management: IV analgesics, Oral pain medications, Multi-modal analgesia.   PONV prophylaxis: Ondansetron (or other 5HT-3), Dexamethasone or Solumedrol     Comments:    Decadron, Zofran.  Diprivan infusion.  Toradol.  Ketamine (0.5 mg/kg).  Magnesium.  RSI, Anectine.  NGT to suction.            Bon Parker MD

## 2021-10-25 NOTE — ED PROVIDER NOTES
EMERGENCY DEPARTMENT ENCOUNTER      NAME: Leon Gresham  AGE: 56 year old male  YOB: 1965  MRN: [id number]  EVALUATION DATE & TIME: 10/24/2021  7:51 PM    PCP: Laron Hayes    ED PROVIDER: Gus Nash M.D.      Chief Complaint   Patient presents with     Abdominal Pain     Hernia         FINAL IMPRESSION:  Periumbilical hernia, reduced  Partial small bowel obstruction    ED COURSE & MEDICAL DECISION MAKING:    Pertinent Labs & Imaging studies reviewed. (See chart for details)  56 year old male presents to the Emergency Department for evaluation of abdominal pain, vomiting and diarrhea.  Patient first started getting ill on Thursday.  He attributes this to eating a bad hot dog.  He first was having trouble with vomiting and diarrhea.  Diarrhea improved but vomiting persisted.  And over the last 24 hours she has had increasing abdominal distention and abdominal pain.  He describes it as an achiness.  Does get improved slightly with vomiting.  Patient denies prior similar episodes.  He does report having a hernia near his [facility] for approximately 4 years.  He reports that does not feel any differently.  Patient was seen initially in urgent care.  Given his abdominal tenderness in the hernia he was sent in for further evaluation.  On exam he is obese male in mild distress.  Heart and lungs unremarkable.  Abdomen slightly distended with hypoactive bowel sounds.  Patient with approximately 4 cm periumbilical hernia.  There are some overlying erythema.  Patient attributes this to irritation from his belt buckle.  There is only mild tenderness to the area.  Gentle pressure was applied to the area for almost 10 minutes with attempts at reduction of suspected incarcerated hernia.  However no obvious reduction in hernia size and only mild tenderness.  Findings more consistent with fatty hernia with other symptoms of vomiting diarrhea potentially unrelated.  Patient discussed briefly with surgery.  We  will proceed with laboratory evaluation and imaging.  Patient treated symptomatically with intravenous morphine.. Patient appears non toxic with stable vitals signs.         8:02 PM I met with the patient for the initial interview and physical examination. Discussed plan for treatment and workup in the ED.    8:17 PM Spoke with Dr. Kaufman, general surgery.   9:23 PM.  White cell count minimally elevated at 12.3.  Hemoglobin normal.  Patient with mild renal insufficiency with BUN of 28 creatinine 1.82.  Calcium slightly elevated at 10.9.  Liver function tests normal.  Will proceed with CT imaging.  Pepcid and Zofran also ordered for additional symptomatic relief  9:52 PM Updated patient on imaging. Performed a hernia reduction.  The patient's mechanical small bowel obstruction will admit overnight to ensure turn of bowel function.  10:25 PM Spoke with , hospitalist, over patient's admission  At the conclusion of the encounter I discussed the results of all of the tests and the disposition. The questions were answered and return precautions provided. The patient or family acknowledged understanding and was agreeable with the care plan.       PPE: Provider wore gloves, N95 mask, eye protection, surgical cap, and paper mask.     MEDICATIONS GIVEN IN THE EMERGENCY:  Medications   0.9% sodium chloride BOLUS (1,000 mLs Intravenous New Bag 10/24/21 2036)   morphine (PF) injection 4 mg (4 mg Intravenous Given 10/24/21 2036)   ondansetron (ZOFRAN) injection 4 mg (4 mg Intravenous Given 10/24/21 2114)   famotidine (PEPCID) injection 20 mg (20 mg Intravenous Given 10/24/21 2114)   iopamidol (ISOVUE-370) solution 75 mL (75 mLs Intravenous Given 10/24/21 2129)       NEW PRESCRIPTIONS STARTED AT TODAY'S ER VISIT  New Prescriptions    No medications on file          =================================================================    HPI    Patient information was obtained from: Patient    Use of Intrepreter: N/A          Leon Gresham is a 56 year old male with a pertient medical history of obesity, and hypertension who presents to the ED for evaluation of an umbilical hernia.    Patient states he has had this hernia for four years. He notes it has not bothered him till now. He believes he suffered food poisoning on Friday (10/22/21) after having eaten a hot dog from the gas station. He notes that shortly after eating he vomited and had slight diarrhea. Patient states he vomited all Friday night. He states he now feels bloated. He notes that since this incident he believes his hernia has popped out more. He notes the hernia is red as his pants got tighter throughout the day and the belt sits just above it which irritated it. Patient states he had a good bowel movement this morning. He notes he last ate and drank around 11 AM today. Patient denies any history of abdominal surgeries. Patient denies any other complaints at the time.       REVIEW OF SYSTEMS   Constitutional:  Denies fever, chills  Respiratory:  Denies productive cough or increased work of breathing  Cardiovascular:  Denies chest pain, palpitations  GI:  Denies change in bladder habits, Endorses pain over umbilical hernia, vomiting and diarrhea    Musculoskeletal:  Denies any new muscle/joint swelling  Skin:  Denies rash   Neurologic:  Denies focal weakness  All systems negative except as marked.     PAST MEDICAL HISTORY:  No past medical history on file.    PAST SURGICAL HISTORY:  No past surgical history on file.      CURRENT MEDICATIONS:      Current Facility-Administered Medications:      0.9% sodium chloride BOLUS, 1,000 mL, Intravenous, Once, Gus Nash MD, Last Rate: 250 mL/hr at 10/24/21 2036, 1,000 mL at 10/24/21 2036    Current Outpatient Medications:      HORTENSIA THYROID 240 MG tablet, Take 240 mg by mouth daily, Disp: , Rfl:      CALCIUM-MAGNESIUM-ZINC PO, Take 3 tablets by mouth every evening, Disp: , Rfl:      lisinopril (ZESTRIL) 10 MG tablet, Take  10 mg by mouth daily, Disp: , Rfl:      lysine 500 MG TABS, Take 500 mg by mouth 2 times daily, Disp: , Rfl:      MAGNESIUM CITRATE PO, Take 1 tablet by mouth daily, Disp: , Rfl:      multivitamin w/minerals (MULTI-VITAMIN) tablet, Take 1 tablet by mouth daily, Disp: , Rfl:     ALLERGIES:  No Known Allergies    FAMILY HISTORY:  No family history on file.    SOCIAL HISTORY:   Social History     Socioeconomic History     Marital status: Single     Spouse name: Not on file     Number of children: Not on file     Years of education: Not on file     Highest education level: Not on file   Occupational History     Not on file   Tobacco Use     Smoking status: Former Smoker     Smokeless tobacco: Never Used   Substance and Sexual Activity     Alcohol use: Not on file     Drug use: Not on file     Sexual activity: Not on file   Other Topics Concern     Not on file   Social History Narrative     Not on file     Social Determinants of Health     Financial Resource Strain:      Difficulty of Paying Living Expenses:    Food Insecurity:      Worried About Running Out of Food in the Last Year:      Ran Out of Food in the Last Year:    Transportation Needs:      Lack of Transportation (Medical):      Lack of Transportation (Non-Medical):    Physical Activity:      Days of Exercise per Week:      Minutes of Exercise per Session:    Stress:      Feeling of Stress :    Social Connections:      Frequency of Communication with Friends and Family:      Frequency of Social Gatherings with Friends and Family:      Attends Jew Services:      Active Member of Clubs or Organizations:      Attends Club or Organization Meetings:      Marital Status:    Intimate Partner Violence:      Fear of Current or Ex-Partner:      Emotionally Abused:      Physically Abused:      Sexually Abused:        VITALS:  Patient Vitals for the past 24 hrs:   BP Temp Temp src Pulse Resp SpO2 Weight   10/24/21 2115 -- -- -- 75 16 93 % --   10/24/21 1830 (!)  142/78 98  F (36.7  C) Oral 98 18 97 % 106.6 kg (235 lb)        PHYSICAL EXAM    Constitutional:  Awake, alert, in no apparent distress  HENT:  Normocephalic, Atraumatic. Bilateral external ears normal. Oropharynx moist. Nose normal. Neck- Normal range of motion with no guarding, No midline cervical tenderness, Supple, No stridor.   Eyes:  PERRL, EOMI with no signs of entrapment, Conjunctiva normal, No discharge.   Respiratory:  Normal breath sounds, No respiratory distress, No wheezing.    Cardiovascular:  Normal heart rate, Normal rhythm, No appreciable rubs or gallops.   GI:  Soft, No distension, 4 cm umbilical hernia, firm, mild tenderness, faint overlying erythema that spreads slightly  Musculoskeletal:  Intact distal pulses, No edema. Good range of motion in all major joints. No tenderness to palpation or major deformities noted.  Integument:  Warm, Dry, No erythema, No rash.   Neurologic:  Alert & oriented, Normal motor function, Normal sensory function, No focal deficits noted.   Psychiatric:  Affect normal, Judgment normal, Mood normal.     LAB:  All pertinent labs reviewed and interpreted.  Results for orders placed or performed during the hospital encounter of 10/24/21   CT Abdomen Pelvis w Contrast    Impression    IMPRESSION:   1.  There is a small periumbilical hernia containing a short segment of small bowel. There is resultant mechanical small bowel obstruction secondary to this hernia. Incarcerated hernia not excluded.    2.  Small amounts of scattered free fluid. No free air.    3.  No significant findings elsewhere.   Comprehensive metabolic panel   Result Value Ref Range    Sodium 140 136 - 145 mmol/L    Potassium 3.9 3.5 - 5.0 mmol/L    Chloride 94 (L) 98 - 107 mmol/L    Carbon Dioxide (CO2) 30 22 - 31 mmol/L    Anion Gap 16 5 - 18 mmol/L    Urea Nitrogen 28 (H) 8 - 22 mg/dL    Creatinine 1.82 (H) 0.70 - 1.30 mg/dL    Calcium 10.9 (H) 8.5 - 10.5 mg/dL    Glucose 120 70 - 125 mg/dL    Alkaline  Phosphatase 99 45 - 120 U/L    AST 19 0 - 40 U/L    ALT 23 0 - 45 U/L    Protein Total 7.7 6.0 - 8.0 g/dL    Albumin 4.2 3.5 - 5.0 g/dL    Bilirubin Total 1.1 (H) 0.0 - 1.0 mg/dL    GFR Estimate 40 (L) >60 mL/min/1.73m2   Result Value Ref Range    Lipase 29 0 - 52 U/L   Lactic acid whole blood   Result Value Ref Range    Lactic Acid 1.3 0.7 - 2.0 mmol/L   CBC (+ platelets, no diff)   Result Value Ref Range    WBC Count 12.3 (H) 4.0 - 11.0 10e3/uL    RBC Count 5.42 4.40 - 5.90 10e6/uL    Hemoglobin 17.0 13.3 - 17.7 g/dL    Hematocrit 49.9 40.0 - 53.0 %    MCV 92 78 - 100 fL    MCH 31.4 26.5 - 33.0 pg    MCHC 34.1 31.5 - 36.5 g/dL    RDW 13.5 10.0 - 15.0 %    Platelet Count 253 150 - 450 10e3/uL       RADIOLOGY:  Reviewed all pertinent imaging. Please see official radiology report.  CT Abdomen Pelvis w Contrast   Preliminary Result   IMPRESSION:    1.  There is a small periumbilical hernia containing a short segment of small bowel. There is resultant mechanical small bowel obstruction secondary to this hernia. Incarcerated hernia not excluded.      2.  Small amounts of scattered free fluid. No free air.      3.  No significant findings elsewhere.        PROCEDURE:  [facility]    Procedure: Hernia Reduction    Date/Time: 10/24/2021 9:52 PM  Performed by: Gus Nash MD  Authorized by: Gus Nash MD     PROCEDURE   Patient Tolerance:  Patient tolerated the procedure well with no immediate complications      Gentle continuous pressure placed on the periumbilical hernia for 10 minutes with eventual reduction.  Patient does report improvement in wellbeing.      I, Jaqueline Lira, am serving as a scribe to document services personally performed by Gus Nash MD, based on my observation and the provider's statements to me. IGus MD attest that Jaqueline Lira is acting in a scribe capacity, has observed my performance of the services and has documented them in accordance  with my direction.    Gus Nash M.D.  Emergency Medicine  [facility] EMERGENCY DEPARTMENT     Gus Nash MD  10/24/21 3905

## 2021-10-26 LAB
ALBUMIN SERPL-MCNC: 3.3 G/DL (ref 3.5–5)
ALP SERPL-CCNC: 83 U/L (ref 45–120)
ALT SERPL W P-5'-P-CCNC: 16 U/L (ref 0–45)
ANION GAP SERPL CALCULATED.3IONS-SCNC: 9 MMOL/L (ref 5–18)
AST SERPL W P-5'-P-CCNC: 15 U/L (ref 0–40)
BASOPHILS # BLD AUTO: 0 10E3/UL (ref 0–0.2)
BASOPHILS NFR BLD AUTO: 0 %
BILIRUB SERPL-MCNC: 0.8 MG/DL (ref 0–1)
BUN SERPL-MCNC: 28 MG/DL (ref 8–22)
CALCIUM SERPL-MCNC: 8.5 MG/DL (ref 8.5–10.5)
CHLORIDE BLD-SCNC: 104 MMOL/L (ref 98–107)
CO2 SERPL-SCNC: 28 MMOL/L (ref 22–31)
CREAT SERPL-MCNC: 1.33 MG/DL (ref 0.7–1.3)
EOSINOPHIL # BLD AUTO: 0 10E3/UL (ref 0–0.7)
EOSINOPHIL NFR BLD AUTO: 0 %
ERYTHROCYTE [DISTWIDTH] IN BLOOD BY AUTOMATED COUNT: 12.7 % (ref 10–15)
GFR SERPL CREATININE-BSD FRML MDRD: 59 ML/MIN/1.73M2
GLUCOSE BLD-MCNC: 140 MG/DL (ref 70–125)
GLUCOSE BLDC GLUCOMTR-MCNC: 102 MG/DL (ref 70–99)
GLUCOSE BLDC GLUCOMTR-MCNC: 108 MG/DL (ref 70–99)
HCT VFR BLD AUTO: 43.2 % (ref 40–53)
HGB BLD-MCNC: 14.2 G/DL (ref 13.3–17.7)
IMM GRANULOCYTES # BLD: 0.1 10E3/UL
IMM GRANULOCYTES NFR BLD: 1 %
LYMPHOCYTES # BLD AUTO: 0.6 10E3/UL (ref 0.8–5.3)
LYMPHOCYTES NFR BLD AUTO: 6 %
MCH RBC QN AUTO: 30.5 PG (ref 26.5–33)
MCHC RBC AUTO-ENTMCNC: 32.9 G/DL (ref 31.5–36.5)
MCV RBC AUTO: 93 FL (ref 78–100)
MONOCYTES # BLD AUTO: 0.5 10E3/UL (ref 0–1.3)
MONOCYTES NFR BLD AUTO: 6 %
NEUTROPHILS # BLD AUTO: 8.5 10E3/UL (ref 1.6–8.3)
NEUTROPHILS NFR BLD AUTO: 87 %
NRBC # BLD AUTO: 0 10E3/UL
NRBC BLD AUTO-RTO: 0 /100
PLATELET # BLD AUTO: 224 10E3/UL (ref 150–450)
POTASSIUM BLD-SCNC: 4.4 MMOL/L (ref 3.5–5)
PROT SERPL-MCNC: 6.4 G/DL (ref 6–8)
RBC # BLD AUTO: 4.66 10E6/UL (ref 4.4–5.9)
SODIUM SERPL-SCNC: 141 MMOL/L (ref 136–145)
WBC # BLD AUTO: 9.6 10E3/UL (ref 4–11)

## 2021-10-26 PROCEDURE — 120N000001 HC R&B MED SURG/OB

## 2021-10-26 PROCEDURE — C9113 INJ PANTOPRAZOLE SODIUM, VIA: HCPCS | Performed by: SURGERY

## 2021-10-26 PROCEDURE — 99232 SBSQ HOSP IP/OBS MODERATE 35: CPT | Performed by: INTERNAL MEDICINE

## 2021-10-26 PROCEDURE — 250N000009 HC RX 250: Performed by: INTERNAL MEDICINE

## 2021-10-26 PROCEDURE — 250N000011 HC RX IP 250 OP 636: Performed by: SURGERY

## 2021-10-26 PROCEDURE — 258N000003 HC RX IP 258 OP 636: Performed by: INTERNAL MEDICINE

## 2021-10-26 PROCEDURE — 80053 COMPREHEN METABOLIC PANEL: CPT | Performed by: SURGERY

## 2021-10-26 PROCEDURE — 99024 POSTOP FOLLOW-UP VISIT: CPT | Performed by: PHYSICIAN ASSISTANT

## 2021-10-26 PROCEDURE — 250N000013 HC RX MED GY IP 250 OP 250 PS 637: Performed by: SURGERY

## 2021-10-26 PROCEDURE — 85025 COMPLETE CBC W/AUTO DIFF WBC: CPT | Performed by: SURGERY

## 2021-10-26 PROCEDURE — 36415 COLL VENOUS BLD VENIPUNCTURE: CPT | Performed by: SURGERY

## 2021-10-26 PROCEDURE — 250N000011 HC RX IP 250 OP 636: Performed by: INTERNAL MEDICINE

## 2021-10-26 RX ORDER — FOLIC ACID 5 MG/ML
1 INJECTION, SOLUTION INTRAMUSCULAR; INTRAVENOUS; SUBCUTANEOUS DAILY
Status: DISCONTINUED | OUTPATIENT
Start: 2021-10-26 | End: 2021-10-27 | Stop reason: HOSPADM

## 2021-10-26 RX ORDER — ACETAMINOPHEN 325 MG/1
975 TABLET ORAL EVERY 8 HOURS
COMMUNITY
Start: 2021-10-26

## 2021-10-26 RX ORDER — AMOXICILLIN 250 MG
1 CAPSULE ORAL 2 TIMES DAILY
COMMUNITY
Start: 2021-10-26 | End: 2021-11-03

## 2021-10-26 RX ORDER — THIAMINE HYDROCHLORIDE 100 MG/ML
100 INJECTION, SOLUTION INTRAMUSCULAR; INTRAVENOUS DAILY
Status: DISCONTINUED | OUTPATIENT
Start: 2021-10-26 | End: 2021-10-27 | Stop reason: HOSPADM

## 2021-10-26 RX ADMIN — ACETAMINOPHEN 975 MG: 325 TABLET ORAL at 14:24

## 2021-10-26 RX ADMIN — FOLIC ACID 1 MG: 5 INJECTION, SOLUTION INTRAMUSCULAR; INTRAVENOUS; SUBCUTANEOUS at 09:38

## 2021-10-26 RX ADMIN — PIPERACILLIN SODIUM AND TAZOBACTAM SODIUM 3.38 G: 3; .375 INJECTION, POWDER, LYOPHILIZED, FOR SOLUTION INTRAVENOUS at 11:10

## 2021-10-26 RX ADMIN — PIPERACILLIN SODIUM AND TAZOBACTAM SODIUM 3.38 G: 3; .375 INJECTION, POWDER, LYOPHILIZED, FOR SOLUTION INTRAVENOUS at 18:13

## 2021-10-26 RX ADMIN — DOCUSATE SODIUM 50 MG AND SENNOSIDES 8.6 MG 1 TABLET: 8.6; 5 TABLET, FILM COATED ORAL at 21:07

## 2021-10-26 RX ADMIN — PIPERACILLIN SODIUM AND TAZOBACTAM SODIUM 3.38 G: 3; .375 INJECTION, POWDER, LYOPHILIZED, FOR SOLUTION INTRAVENOUS at 02:23

## 2021-10-26 RX ADMIN — PANTOPRAZOLE SODIUM 40 MG: 40 INJECTION, POWDER, FOR SOLUTION INTRAVENOUS at 17:18

## 2021-10-26 RX ADMIN — SODIUM CHLORIDE, POTASSIUM CHLORIDE, SODIUM LACTATE AND CALCIUM CHLORIDE: 600; 310; 30; 20 INJECTION, SOLUTION INTRAVENOUS at 18:26

## 2021-10-26 RX ADMIN — ACETAMINOPHEN 975 MG: 325 TABLET ORAL at 23:06

## 2021-10-26 RX ADMIN — THIAMINE HYDROCHLORIDE 100 MG: 100 INJECTION, SOLUTION INTRAMUSCULAR; INTRAVENOUS at 09:38

## 2021-10-26 ASSESSMENT — ACTIVITIES OF DAILY LIVING (ADL)
ADLS_ACUITY_SCORE: 7
ADLS_ACUITY_SCORE: 5
ADLS_ACUITY_SCORE: 5
ADLS_ACUITY_SCORE: 7
ADLS_ACUITY_SCORE: 5
ADLS_ACUITY_SCORE: 7
ADLS_ACUITY_SCORE: 5
ADLS_ACUITY_SCORE: 7
ADLS_ACUITY_SCORE: 5
ADLS_ACUITY_SCORE: 7
ADLS_ACUITY_SCORE: 7
ADLS_ACUITY_SCORE: 5
ADLS_ACUITY_SCORE: 7

## 2021-10-26 NOTE — PROGRESS NOTES
A/P    Primary diagnosis:    Incarcerated umbilical hernia with small bowel obstruction:  POD#1 umbilical herniorrhaphy.    -clears  -Zosyn  -PPI IV  -LR 75ml/hr  -tylenol, oxycodone prn, iv dilaudid prn  -surgery following    Secondary diagnoses:    RICH:  Improved. Cr 1.8->1.33  -LR 100ml/hr while NPO  -bmp a.m.  -strict I/o, daily weight    Essential hypertension: low normotensive BP  -hold lisinopril w/ normotensive BP, RICH     Hypothyroidism  -On thyroid [place], resume when taking by mouth     Moderate severe alcohol use  -thiamine/folate  -monitor for withdrawal off CIWA for now    covid negative 10/24    scd's    discharge >2d pending bowel function return, tolerating PO        S:  Afebrile. Events overnight noted    O:  Temp: 98  F (36.7  C) Temp src: Oral BP: 106/61 Pulse: 63   Resp: 18 SpO2: 97 % O2 Device: Oxymask Oxygen Delivery: 4 LPM  gen nad  cv rrr, no edema  Lungs ctab  abd bs hypoactive, generalized ttp, surgical dressing in place  Neuro nonfocal    Lab/imaging reviewed

## 2021-10-26 NOTE — PLAN OF CARE
Problem: Adult Inpatient Plan of Care  Goal: Optimal Comfort and Wellbeing  Outcome: Improving     Problem: Nausea and Vomiting  Goal: Fluid and Electrolyte Balance  Outcome: Improving-none noted - advancing diet  Problem: Adult Inpatient Plan of Care  Goal: Plan of Care Review  Outcome: Change based on patient need/priority  Flowsheets (Taken 10/26/2021 1113)  Plan of Care Reviewed With: patient  No complaints of pain/had bowel movement today

## 2021-10-26 NOTE — PLAN OF CARE
Pt had pain in nose 7/10 at start of shift - given PRN hydromorphone for pain.  He went to surgery taken by Dr. Ramos and underwent a umbilical hernia repair.  He returned to unit comfortable with O2 mask on at 1 LPM due to having desatted to 80's in PACU.  He continues on intermittent low suction per NG and continues to have small amounts of dark green gastric contents in tubing - none in suction container.  Faint bowel sounds - hard to hear over lower abdominal truss.  No nausea or vomiting this shift.    Problem: Adult Inpatient Plan of Care  Goal: Plan of Care Review  Outcome: No Change     Problem: Nausea and Vomiting  Goal: Fluid and Electrolyte Balance  Outcome: Improving

## 2021-10-26 NOTE — OP NOTE
Operative Note    Name:  Leon Gresham  Location: [facility] Main OR  Procedure Date:  10/24/2021 - 10/25/2021  PCP:  Laron Hayes    Surgery Performed:  Procedure/Surgery Information   Procedure: Procedure(s):  HERNIORRHAPHY, UMBILICAL, OPEN   Surgeon(s): Surgeon(s) and Role:     * Kevan Ramos MD - Primary   Specimens: ID Type Source Tests Collected by Time Destination   1 : Umbilical hernia sac Tissue Abdomen SURGICAL PATHOLOGY EXAM Kevan Ramos MD 10/25/2021  6:57 PM                Pre-Procedure Diagnosis:  Umbilical hernia with obstruction [K42.0]    Post-Procedure Diagnosis:    Umbilical hernia with obstruction [K42.0]    Anesthesia:  General     Past Medical History:   Diagnosis Date     Hypertension      Thyroid disease        Patient Active Problem List    Diagnosis Date Noted     RICH (acute kidney injury) (H) 10/25/2021     Priority: Medium     Acquired atrophy of thyroid 10/24/2021     Priority: Medium     Candidiasis of skin and nails 10/24/2021     Priority: Medium     Hypertension 10/24/2021     Priority: Medium     Hypothyroidism 10/24/2021     Priority: Medium     Obesity 10/24/2021     Priority: Medium     Tobacco user 10/24/2021     Priority: Medium     Umbilical hernia with obstruction 10/24/2021     Priority: Medium       Findings:  Incarcerated umbilical hernia with small bowel obstruction    Operative Report:    Patient brought the operating room placed in supine position given general endotracheal anesthesia sterilely prepped and draped in the usual surgical fashion and a timeout process is undertaken.    Curvilinear incision was made above the umbilicus subcutaneous tissues were dissected down through with electrocautery.  The hernia sac was identified and dissected around with sharp and blunt dissection.  The hernia sac was  from surrounding tissues and the umbilical stalk.  I opened the fascia a bit on the lateral poles of the umbilical hernia.  This  reduce the tension in this area and is better able to work with the hernia sac at that point.  I opened into the hernia sac and did not see any bowel inside of the hernia defect any longer.  I believe that it reduced once I freed up the fascia.  I appeared through the umbilical hernia defect opening and did not appreciate or see any compromised bowel.  With that I remove the hernia sac which I sent for pathology.  I dissected the fascial edges free of overlying subcutaneous tissues.  I closed the umbilical hernia defect with a series of interrupted #1 PDS sutures and 2 interrupted 0 Prolene sutures.  Once the fascia was closed I infused the fascia with local anesthetic quarter percent Marcaine and then irrigated the open wound with 750 cc of normal saline.  I drew this subcutaneous tissues together tacking umbilicus down to the abdominal wall fascia with 3-0 Vicryl sutures and then tacking the tissues of the umbilical stalk to the surrounding fatty tissues to help close the defect that was previously taken by the hernia sac.  The skin was closed with a running 4-0 subcuticular Monocryl suture.  The wound was packed with two 4 x 4 gauze down and the stalk of the umbilicus and a medium size Tegaderm was placed down over the wound.  The patient had abdominal binder placed and was extubated and taken to recovery.    Estimated Blood Loss:   10 cc       Drains:   NG/OG/NJ Tube Nasogastric Right nostril (Active)   Site Description WDL 10/25/21 1500   Status Suction-low intermittent 10/25/21 1500   Drainage Appearance Bile 10/25/21 1500   Placement Confirmation Aspiration of gastric content 10/25/21 1500   Output (ml) 750 ml 10/25/21 1500       Implants:  * No implants in log *    Complications:    None    Kevan Ramos MD     Date: 10/25/2021  Time: 7:45 PM

## 2021-10-26 NOTE — PROGRESS NOTES
"ASSESSMENT:  1. Umbilical hernia with obstruction        Leon Gresham is a 56 year old male who is s/p HERNIORRHAPHY, UMBILICAL, OPEN on 10/25    PLAN:  -ADAT  -Would be ok to discharge from surgical standpoint if medically cleared, will have surgery orders and recs placed  -F/u with Dr. Ramos next week  -Work note placed on chart    Jose Craven PA-C  Pager - [phone]  Phone - [phone]   General Surgery    SUBJECTIVE:   He is doing well, pain tolerable, no n/v, passing gas and BM this AM    Patient Vitals for the past 24 hrs:   BP Temp Temp src Pulse Resp SpO2 Height Weight   10/26/21 0730 106/61 98  F (36.7  C) Oral 63 18 97 % -- --   10/26/21 0420 110/62 97.4  F (36.3  C) Oral 62 20 97 % -- --   10/25/21 2331 92/53 98.4  F (36.9  C) Oral 62 20 95 % -- --   10/25/21 2118 130/73 98  F (36.7  C) Oral 69 16 94 % -- --   10/25/21 2030 119/67 98  F (36.7  C) Temporal 68 11 94 % -- --   10/25/21 2015 112/84 -- -- 68 16 96 % -- --   10/25/21 2000 109/83 -- -- 63 11 100 % -- --   10/25/21 1945 124/74 97.4  F (36.3  C) Temporal -- 16 -- -- --   10/25/21 1900 -- -- -- -- 20 -- -- --   10/25/21 1753 102/76 98.7  F (37.1  C) -- 64 -- -- -- --   10/25/21 1549 120/77 98.7  F (37.1  C) Oral 79 18 90 % -- --   10/25/21 1435 126/73 98.5  F (36.9  C) Oral 62 16 92 % 1.803 m (5' 11\") 106 kg (233 lb 11 oz)   10/25/21 1315 133/81 -- -- -- -- 92 % -- --   10/25/21 1300 108/63 -- -- 70 -- 95 % -- --   10/25/21 1200 124/76 -- -- 64 -- 94 % -- --   10/25/21 1100 124/79 -- -- 60 -- 94 % -- --   10/25/21 1000 122/78 -- -- 58 -- 95 % -- --        PHYSICAL EXAM:  GEN: No acute distress, comfortable  CV:RRR  ABD:soft, nondistended, expected ttp, dressing intact  EXT:No cyanosis, edema or obvious abnormalities    10/25 0700 - 10/26 0659  In: 1183.33 [I.V.:1183.33]  Out: 1210 [Urine:450]    Lab Results   Component Value Date    WBC 9.6 10/26/2021    HGB 14.2 10/26/2021    HCT 43.2 10/26/2021    MCV 93 10/26/2021     10/26/2021 "     INR/Prothrombin Time  Recent Labs   Lab 10/26/21  0523      CO2 28   BUN 28*     Lab Results   Component Value Date    ALT 16 10/26/2021    AST 15 10/26/2021    ALKPHOS 83 10/26/2021

## 2021-10-26 NOTE — ANESTHESIA CARE TRANSFER NOTE
Patient: Leon Gresham    Procedure: Procedure(s):  HERNIORRHAPHY, UMBILICAL, OPEN       Diagnosis: Umbilical hernia with obstruction [K42.0]  Diagnosis Additional Information: No value filed.    Anesthesia Type:   General     Note:    Oropharynx: oropharynx clear of all foreign objects  Level of Consciousness: awake  Oxygen Supplementation: face mask  Level of Supplemental Oxygen (L/min / FiO2): 8    Dentition: dentition unchanged  Vital Signs Stable: post-procedure vital signs reviewed and stable  Report to RN Given: handoff report given  Patient transferred to: PACU    Handoff Report: Identifed the Patient, Identified the Reponsible Provider, Reviewed the pertinent medical history, Discussed the surgical course, Reviewed Intra-OP anesthesia mangement and issues during anesthesia, Set expectations for post-procedure period and Allowed opportunity for questions and acknowledgement of understanding      Vitals:  Vitals Value Taken Time   /74 10/25/21 1943   Temp 97.4 f 10/25/21 1945   Pulse 64 10/25/21 1945   Resp 14 10/25/21 1945   SpO2 93 % 10/25/21 1945   Vitals shown include unvalidated device data.    Electronically Signed By: RONN Cooper CRNA  October 25, 2021  7:46 PM

## 2021-10-26 NOTE — ANESTHESIA POSTPROCEDURE EVALUATION
Patient: Leon Gresham    Procedure: Procedure(s):  HERNIORRHAPHY, UMBILICAL, OPEN       Diagnosis:Umbilical hernia with obstruction [K42.0]  Diagnosis Additional Information: No value filed.    Anesthesia Type:  General    Note:  Disposition: Inpatient   Postop Pain Control: Uneventful            Sign Out: Well controlled pain   PONV:    Neuro/Psych: Uneventful            Sign Out: Acceptable/Baseline neuro status   Airway/Respiratory: Uneventful            Sign Out: Acceptable/Baseline resp. status; O2 supplementation               Oxygen: Nasal Cannula   CV/Hemodynamics: Uneventful            Sign Out: Acceptable CV status; No obvious hypovolemia; No obvious fluid overload   Other NRE: NONE   DID A NON-ROUTINE EVENT OCCUR? No    Event details/Postop Comments:  Doing well, no nausea; pain well controlled           Last vitals:  Vitals Value Taken Time   /67 10/25/21 2030   Temp 36.7  C (98  F) 10/25/21 2030   Pulse 68 10/25/21 2037   Resp 25 10/25/21 2035   SpO2 95 % 10/25/21 2037   Vitals shown include unvalidated device data.    Electronically Signed By: Bon Parker MD  October 25, 2021  8:37 PM

## 2021-10-26 NOTE — PROGRESS NOTES
General Surgery Progress Note    POST OP DAY  # 1 s/p Umbilical Hernia Repair    Subjective:   Pt is feeling well after umbilical hernia repair.  He is having bowel movements    Vitals:    10/25/21 2030 10/25/21 2118 10/25/21 2331 10/26/21 0420   BP: 119/67 130/73 92/53 110/62   BP Location:  Left arm Left arm Left arm   Cuff Size: Adult Regular      Pulse: 68 69 62 62   Resp: 11 16 20 20   Temp: 98  F (36.7  C) 98  F (36.7  C) 98.4  F (36.9  C) 97.4  F (36.3  C)   TempSrc: Temporal Oral Oral Oral   SpO2: 94% 94% 95% 97%   Weight:       Height:           Physical Exam:  Lungs:  CTA  CV:       RRR  Ab:       Soft, + BS,     Recent Labs   Lab 10/26/21  0523   WBC 9.6   HGB 14.2   HCT 43.2          Recent Labs   Lab 10/26/21  0523      CO2 28   BUN 28*   ALBUMIN 3.3*   ALKPHOS 83   ALT 16   AST 15       Assessment:  Pt is progressing well.    Plan: discharge to home    Kevan Ramos MD  [facility]  [phone]

## 2021-10-26 NOTE — PLAN OF CARE
Problem: Nausea and Vomiting  Goal: Fluid and Electrolyte Balance  Outcome: Improving  No N/V overnight  Problem: Adult Inpatient Plan of Care  Goal: Optimal Comfort and Wellbeing  Outcome: Improving  Denies pain    NG [id number]. Voided 350mL using urinal. Hypo bowel sounds.

## 2021-10-26 NOTE — PLAN OF CARE
Pt reports pain is tolerable in the abdomen over hernia incision.  He is up walking and had a medium soft/loose bm this shift.  He continues on IV fluids and Zosyn.    Problem: Bowel Motility Impaired (Surgery Nonspecified)  Goal: Effective Bowel Elimination  Outcome: Improving     Problem: Infection (Surgery Nonspecified)  Goal: Absence of Infection Signs and Symptoms  Outcome: Improving     Problem: Adult Inpatient Plan of Care  Goal: Patient-Specific Goal (Individualized)  Outcome: Adequate for Discharge     Problem: Nausea and Vomiting  Goal: Fluid and Electrolyte Balance  Outcome: Adequate for Discharge     Problem: Bleeding (Surgery Nonspecified)  Goal: Absence of Bleeding  Outcome: Adequate for Discharge     Problem: Ongoing Anesthesia Effects (Surgery Nonspecified)  Goal: Anesthesia/Sedation Recovery  Outcome: Adequate for Discharge  Intervention: Optimize Anesthesia Recovery  Recent Flowsheet Documentation  Taken 10/26/2021 1700 by Otilia Valenzuela RN  Safety Promotion/Fall Prevention: nonskid shoes/slippers when out of bed     Problem: Pain (Surgery Nonspecified)  Goal: Acceptable Pain Control  Outcome: Adequate for Discharge     Problem: Postoperative Nausea and Vomiting (Surgery Nonspecified)  Goal: Nausea and Vomiting Relief  Outcome: Adequate for Discharge     Problem: Postoperative Urinary Retention (Surgery Nonspecified)  Goal: Effective Urinary Elimination  Outcome: Adequate for Discharge     Problem: Adult Inpatient Plan of Care  Goal: Absence of Hospital-Acquired Illness or Injury  Intervention: Identify and Manage Fall Risk  Recent Flowsheet Documentation  Taken 10/26/2021 1700 by Otilia Valenzuela RN  Safety Promotion/Fall Prevention: nonskid shoes/slippers when out of bed

## 2021-10-26 NOTE — DISCHARGE INSTRUCTIONS
From your Surgeon:    Follow up:  An appointment with Dr. Ramos has been requested and should display on your discharge paperwork. Please call [phone] if you have any questions or concerns.     Diet: Regular diet. Patients can have difficulty with constipation following surgery, due in part to the administration of narcotic medications.  If you are suffering with constipation, you should avoid foods such as hard cheeses or red meat.  Foods high in fiber are recommended.      Activity: You should continue to be active at home, including ambulating frequently.  If possible try to limit the amount of time spent in bed.    Restrictions: You should not lift greater than 15 pounds for 2 weeks, and will want to avoid strenuous physical activity for 1-2 weeks.  You should limit your physical activity if it causes you discomfort; however, this should resolve within 1-2 weeks.   Walking does not count as strenuous physical activity.  You are safe to walk up and down stairs.  Following 2 weeks you may resume all normal physical activity.    Work:  You can return to work once your surgical pain has resolved.  If you perform duties that require lifting, pushing or pulling anything greater than 15 pounds, then you would have to be on light duty for the immediate 2 weeks after your surgery (if your work allows light duty).  After 2 weeks, you can return to work without restrictions.    Wound care: 1.  Remove dressings in 2 days  2.  It is okay to shower starting tomorrow  3.  Do not soak in a tub for 1 week       no

## 2021-10-27 VITALS
RESPIRATION RATE: 20 BRPM | TEMPERATURE: 98.4 F | HEIGHT: 71 IN | SYSTOLIC BLOOD PRESSURE: 143 MMHG | BODY MASS INDEX: 32.72 KG/M2 | DIASTOLIC BLOOD PRESSURE: 94 MMHG | WEIGHT: 233.69 LBS | OXYGEN SATURATION: 92 % | HEART RATE: 54 BPM

## 2021-10-27 LAB
ALBUMIN SERPL-MCNC: 2.9 G/DL (ref 3.5–5)
ALP SERPL-CCNC: 69 U/L (ref 45–120)
ALT SERPL W P-5'-P-CCNC: 17 U/L (ref 0–45)
ANION GAP SERPL CALCULATED.3IONS-SCNC: 4 MMOL/L (ref 5–18)
AST SERPL W P-5'-P-CCNC: 13 U/L (ref 0–40)
BILIRUB SERPL-MCNC: 0.7 MG/DL (ref 0–1)
BUN SERPL-MCNC: 26 MG/DL (ref 8–22)
CALCIUM SERPL-MCNC: 7.9 MG/DL (ref 8.5–10.5)
CHLORIDE BLD-SCNC: 103 MMOL/L (ref 98–107)
CO2 SERPL-SCNC: 32 MMOL/L (ref 22–31)
CREAT SERPL-MCNC: 1.25 MG/DL (ref 0.7–1.3)
ERYTHROCYTE [DISTWIDTH] IN BLOOD BY AUTOMATED COUNT: 12.8 % (ref 10–15)
GFR SERPL CREATININE-BSD FRML MDRD: 63 ML/MIN/1.73M2
GLUCOSE BLD-MCNC: 89 MG/DL (ref 70–125)
HCT VFR BLD AUTO: 39.1 % (ref 40–53)
HGB BLD-MCNC: 12.7 G/DL (ref 13.3–17.7)
MCH RBC QN AUTO: 30.4 PG (ref 26.5–33)
MCHC RBC AUTO-ENTMCNC: 32.5 G/DL (ref 31.5–36.5)
MCV RBC AUTO: 94 FL (ref 78–100)
PATH REPORT.COMMENTS IMP SPEC: NORMAL
PATH REPORT.COMMENTS IMP SPEC: NORMAL
PATH REPORT.FINAL DX SPEC: NORMAL
PATH REPORT.GROSS SPEC: NORMAL
PATH REPORT.MICROSCOPIC SPEC OTHER STN: NORMAL
PATH REPORT.RELEVANT HX SPEC: NORMAL
PHOTO IMAGE: NORMAL
PLATELET # BLD AUTO: 200 10E3/UL (ref 150–450)
POTASSIUM BLD-SCNC: 3.6 MMOL/L (ref 3.5–5)
PROT SERPL-MCNC: 5.3 G/DL (ref 6–8)
RBC # BLD AUTO: 4.18 10E6/UL (ref 4.4–5.9)
SODIUM SERPL-SCNC: 139 MMOL/L (ref 136–145)
WBC # BLD AUTO: 6.7 10E3/UL (ref 4–11)

## 2021-10-27 PROCEDURE — 250N000009 HC RX 250: Performed by: INTERNAL MEDICINE

## 2021-10-27 PROCEDURE — 250N000013 HC RX MED GY IP 250 OP 250 PS 637: Performed by: SURGERY

## 2021-10-27 PROCEDURE — 99024 POSTOP FOLLOW-UP VISIT: CPT | Performed by: PHYSICIAN ASSISTANT

## 2021-10-27 PROCEDURE — 258N000003 HC RX IP 258 OP 636: Performed by: INTERNAL MEDICINE

## 2021-10-27 PROCEDURE — 90682 RIV4 VACC RECOMBINANT DNA IM: CPT | Performed by: INTERNAL MEDICINE

## 2021-10-27 PROCEDURE — 99238 HOSP IP/OBS DSCHRG MGMT 30/<: CPT | Performed by: INTERNAL MEDICINE

## 2021-10-27 PROCEDURE — 85014 HEMATOCRIT: CPT | Performed by: INTERNAL MEDICINE

## 2021-10-27 PROCEDURE — 82040 ASSAY OF SERUM ALBUMIN: CPT | Performed by: INTERNAL MEDICINE

## 2021-10-27 PROCEDURE — G0008 ADMIN INFLUENZA VIRUS VAC: HCPCS | Performed by: INTERNAL MEDICINE

## 2021-10-27 PROCEDURE — 250N000011 HC RX IP 250 OP 636: Performed by: SURGERY

## 2021-10-27 PROCEDURE — 36415 COLL VENOUS BLD VENIPUNCTURE: CPT | Performed by: INTERNAL MEDICINE

## 2021-10-27 PROCEDURE — 250N000011 HC RX IP 250 OP 636: Performed by: INTERNAL MEDICINE

## 2021-10-27 PROCEDURE — 88302 TISSUE EXAM BY PATHOLOGIST: CPT | Mod: 26 | Performed by: PATHOLOGY

## 2021-10-27 RX ADMIN — ACETAMINOPHEN 975 MG: 325 TABLET ORAL at 06:42

## 2021-10-27 RX ADMIN — THIAMINE HYDROCHLORIDE 100 MG: 100 INJECTION, SOLUTION INTRAMUSCULAR; INTRAVENOUS at 09:13

## 2021-10-27 RX ADMIN — PIPERACILLIN SODIUM AND TAZOBACTAM SODIUM 3.38 G: 3; .375 INJECTION, POWDER, LYOPHILIZED, FOR SOLUTION INTRAVENOUS at 02:41

## 2021-10-27 RX ADMIN — DOCUSATE SODIUM 50 MG AND SENNOSIDES 8.6 MG 1 TABLET: 8.6; 5 TABLET, FILM COATED ORAL at 09:13

## 2021-10-27 RX ADMIN — INFLUENZA A VIRUS A/WISCONSIN/588/2019 (H1N1) RECOMBINANT HEMAGGLUTININ ANTIGEN, INFLUENZA A VIRUS A/TASMANIA/503/2020 (H3N2) RECOMBINANT HEMAGGLUTININ ANTIGEN, INFLUENZA B VIRUS B/WASHINGTON/02/2019 RECOMBINANT HEMAGGLUTININ ANTIGEN, AND INFLUENZA B VIRUS B/PHUKET/3073/2013 RECOMBINANT HEMAGGLUTININ ANTIGEN 0.5 ML: 45; 45; 45; 45 INJECTION INTRAMUSCULAR at 09:23

## 2021-10-27 RX ADMIN — SODIUM CHLORIDE, POTASSIUM CHLORIDE, SODIUM LACTATE AND CALCIUM CHLORIDE: 600; 310; 30; 20 INJECTION, SOLUTION INTRAVENOUS at 06:44

## 2021-10-27 RX ADMIN — POLYETHYLENE GLYCOL 3350 17 G: 17 POWDER, FOR SOLUTION ORAL at 09:13

## 2021-10-27 RX ADMIN — FOLIC ACID 1 MG: 5 INJECTION, SOLUTION INTRAMUSCULAR; INTRAVENOUS; SUBCUTANEOUS at 09:13

## 2021-10-27 ASSESSMENT — ACTIVITIES OF DAILY LIVING (ADL)
ADLS_ACUITY_SCORE: 5
ADLS_ACUITY_SCORE: 3
ADLS_ACUITY_SCORE: 5

## 2021-10-27 NOTE — PLAN OF CARE
Problem: Adult Inpatient Plan of Care  Goal: Optimal Comfort and Wellbeing  Outcome: Adequate for Discharge     Problem: Adult Inpatient Plan of Care  Goal: Readiness for Transition of Care  Outcome: Adequate for Discharge   Alert oriented x4. Independent with ADLs. Education provided on meds and appointments. Discharged to home at 1230 with family. Took all personal belongings with him.

## 2021-10-27 NOTE — DISCHARGE SUMMARY
[facility]  Hospitalist Discharge Summary      Date of Admission:  10/24/2021  Date of Discharge:  10/27/2021 12:40 PM  Discharging Provider: Destini Temple MD      Discharge Diagnoses   Umbilical hernia with obstruction     Follow-ups Needed After Discharge   Follow-up Appointments     Follow-up and recommended labs and tests       Follow up with primary care provider, Laron Hayes, within 7 days to   evaluate after surgery and for hospital follow- up.             Unresulted Labs Ordered in the Past 30 Days of this Admission     No orders found from 9/24/2021 to 10/25/2021.          Discharge Disposition   Discharged to home  Condition at discharge: Stable      Hospital Course   Incarcerated umbilical hernia with small bowel obstruction:  S/p umbilical herniorrhaphy.    - having BMs and passing flatus  - Surgery seen and cleared for discharge  -Zosyn stopped per surgery no antibiotics needed.   - f/u with surgery       RICH:  Improved. With IVF, recheck labs at follow-up      Essential hypertension:   - pressures increasing and resume home BP meds at discharge      Hypothyroidism  -On thyroid [place]        covid negative 10/24    Consultations This Hospital Stay   SURGERY GENERAL IP CONSULT  SOCIAL WORK IP CONSULT    Code Status   Full Code    Time Spent on this Encounter   I, Destini Temple MD, personally saw the patient today and spent less than or equal to 30 minutes discharging this patient.       Destini Temple MD  [address] [address]  Phone: [phone]  Fax: [phone]  ______________________________________________________________________    Physical Exam   Vital Signs: Temp: 98.4  F (36.9  C) Temp src: Oral BP: (!) 143/94 Pulse: 54   Resp: 20 SpO2: 92 % O2 Device: None (Room air)    Weight: 233 lbs 11 oz  Physical Exam  Constitutional:       General: He is not in acute distress.     Appearance: Normal  appearance. He is not ill-appearing.   HENT:      Head: Normocephalic and atraumatic.   Cardiovascular:      Rate and Rhythm: Regular rhythm. Bradycardia present.      Pulses: Normal pulses.      Heart sounds: Normal heart sounds.   Pulmonary:      Effort: Pulmonary effort is normal.      Breath sounds: Normal breath sounds.   Abdominal:      Palpations: Abdomen is soft.      Comments: Ttp, +BS   Skin:     General: Skin is warm and dry.   Neurological:      Mental Status: He is alert and oriented to person, place, and time. Mental status is at baseline.            Primary Care Physician   Laron Hayes    Discharge Orders      Reason for your hospital stay    Umbilical hernia with obstruction     Follow-up and recommended labs and tests     Follow up with primary care provider, Laron Hayes, within 7 days to evaluate after surgery and for hospital follow- up.     Activity    Your activity upon discharge: activity as tolerated. Further activity and restrictions per Surgery     Diet    Follow this diet upon discharge: Orders Placed This Encounter      Regular Diet Adult       Significant Results and Procedures   Most Recent 3 CBC's:Recent Labs   Lab Test 10/27/21  0548 10/26/21  0523 10/24/21  2033   WBC 6.7 9.6 12.3*   HGB 12.7* 14.2 17.0   MCV 94 93 92    224 253     Most Recent 3 BMP's:Recent Labs   Lab Test 10/27/21  0548 10/26/21  2032 10/26/21  1637 10/26/21  0523 10/25/21  2143 10/25/21  0718     --   --  141  --  139   POTASSIUM 3.6  --   --  4.4  --  4.0   CHLORIDE 103  --   --  104  --  101   CO2 32*  --   --  28  --  29   BUN 26*  --   --  28*  --  27*   CR 1.25  --   --  1.33*  --  1.38*   ANIONGAP 4*  --   --  9  --  9   SOTO 7.9*  --   --  8.5  --  9.2   GLC 89 108* 102* 140*   < > 94    < > = values in this interval not displayed.   ,   Results for orders placed or performed during the hospital encounter of 10/24/21   CT Abdomen Pelvis w Contrast    Narrative    EXAM: CT ABDOMEN PELVIS W  CONTRAST  LOCATION: [facility]  DATE/TIME: 10/24/2021 9:26 PM    INDICATION: Abdominal distension.  COMPARISON: None.  TECHNIQUE: CT scan of the abdomen and pelvis was performed following injection of IV contrast. Multiplanar reformats were obtained. Dose reduction techniques were used.  CONTRAST: Isovue 370 75 ml.    FINDINGS:   LOWER CHEST: Normal.    HEPATOBILIARY: Normal.    PANCREAS: Normal.    SPLEEN: Normal.    ADRENAL GLANDS: Normal.    KIDNEYS/BLADDER: Low-attenuation subcentimeter renal lesion(s). These are compatible with small benign cysts and no specific imaging evaluation or follow-up is recommended. Kidneys are otherwise negative. No hydronephrosis. Bladder is unremarkable.    BOWEL: There is a small periumbilical hernia present containing a short segment of mid small bowel. There is resultant mechanical obstruction secondary to this hernia with moderate fluid distention of loops of proximal small bowel and decompression of   loops of distal small bowel. Incarcerated hernia not excluded. No free air. No significant bowel findings elsewhere.    LYMPH NODES: Normal.    VASCULATURE: Unremarkable.    PELVIC ORGANS: Normal.    ADDITIONAL FINDINGS: Minimal amount of free fluid in the pelvis.    MUSCULOSKELETAL: Minimal degenerative changes in the spine. Benign-appearing lucent lesion in the right hip with some chondroid matrix most likely reflecting a benign enchondroma.      Impression    IMPRESSION:   1.  There is a small periumbilical hernia containing a short segment of small bowel. There is resultant mechanical small bowel obstruction secondary to this hernia. Incarcerated hernia not excluded.    2.  Small amounts of scattered free fluid. No free air.    3.  No significant findings elsewhere.   XR Gastrografin  Challenge    Narrative    EXAM: XR GASTROGRAFIN CHALLENGE  LOCATION: [facility]  DATE/TIME: 10/25/2021 9:00 PM    INDICATION: sbo, umbilical  hernia. Abdominal pain.  COMPARISON: None.  TECHNIQUE: Routine.    FINDINGS:  FLUOROSCOPIC TIME: 0 minutes.  NUMBER OF IMAGES: 2.    9 hours following ingestion of Gastrografin the portable radiographs demonstrate contrast within loops of small bowel which has also migrated into the ascending colon. This suggest that there is no complete bowel obstruction.    Mild gaseous distention of loops of small bowel in the left abdomen to maximal diameter approximately 3.9 cm. Enteric tube with tip in the stomach and side port at the gastroesophageal junction. Retained contrast within the urinary bladder. Benign   calcification involving the proximal right femur with an appearance most suggestive of an enchondroma.      Impression    IMPRESSION:  1.  No evidence of a complete bowel obstruction, with Gastrografin reaching the colon by 9 hours.  2.  Mild gaseous distention of loops of small bowel within the left hemiabdomen, similar to the CT study from yesterday.       Discharge Medications   Current Discharge Medication List      START taking these medications    Details   acetaminophen (TYLENOL) 325 MG tablet Take 3 tablets (975 mg) by mouth every 8 hours    Associated Diagnoses: Umbilical hernia with obstruction      senna-docusate (SENOKOT-S/PERICOLACE) 8.6-50 MG tablet Take 1 tablet by mouth 2 times daily    Associated Diagnoses: Umbilical hernia with obstruction         CONTINUE these medications which have NOT CHANGED    Details   ARMOUR THYROID 240 MG tablet Take 240 mg by mouth daily      CALCIUM-MAGNESIUM-ZINC PO Take 3 tablets by mouth every evening      lisinopril (ZESTRIL) 10 MG tablet Take 10 mg by mouth daily      lysine 500 MG TABS Take 500 mg by mouth 2 times daily      MAGNESIUM CITRATE PO Take 1 tablet by mouth daily      multivitamin w/minerals (MULTI-VITAMIN) tablet Take 1 tablet by mouth daily           Allergies   No Known Allergies

## 2021-10-27 NOTE — PROGRESS NOTES
ASSESSMENT:  1. Umbilical hernia with obstruction        Leon Gresham is a 56 year old male who is s/p HERNIORRHAPHY, UMBILICAL, OPEN on 10/25    PLAN:  -ADAT  -Ok to discharge from surgical standpoint, surgery orders and recs placed  -F/u with Dr. Ramos next week  -Work note done yesterday  -Does not need any further abx at discharge    Jose Craven PA-C  Pager - [phone]  Phone - [phone]   General Surgery    SUBJECTIVE:   He is doing well, pain tolerable, no n/v, passing gas and BM, tolerated diet yesterday    Patient Vitals for the past 24 hrs:   BP Temp Temp src Pulse Resp SpO2   10/27/21 0717 (!) 143/94 98.4  F (36.9  C) Oral 54 20 92 %   10/27/21 0251 -- -- -- -- -- 91 %   10/26/21 2300 (!) 143/87 97.4  F (36.3  C) Oral 61 16 94 %   10/26/21 1529 (!) 140/83 98.2  F (36.8  C) Oral 63 16 94 %        PHYSICAL EXAM:  GEN: No acute distress, comfortable  CV:RRR  ABD:soft, nondistended, expected ttp, dressing intact  EXT:No cyanosis, edema or obvious abnormalities    10/26 0700 - 10/27 0659  In: 2125 [P.O.:1190; I.V.:935]  Out: 350     Lab Results   Component Value Date    WBC 9.6 10/26/2021    HGB 14.2 10/26/2021    HCT 43.2 10/26/2021    MCV 93 10/26/2021     10/26/2021     INR/Prothrombin Time  Recent Labs   Lab 10/27/21  0548      CO2 32*   BUN 26*     Lab Results   Component Value Date    ALT 17 10/27/2021    AST 13 10/27/2021    ALKPHOS 69 10/27/2021

## 2021-10-27 NOTE — PLAN OF CARE
Problem: Bowel Motility Impaired (Surgery Nonspecified)  Goal: Effective Bowel Elimination  Outcome: Improving  Pt bowel sounds present.  Noted loose BM this am.  Denies nausea, tolerating po intake.     Problem: Infection (Surgery Nonspecified)  Goal: Absence of Infection Signs and Symptoms  Outcome: Improving  Pt afebrile.  Zosyn infusing per order.     Problem: Adult Inpatient Plan of Care  Goal: Optimal Comfort and Wellbeing  Outcome: Improving  Cares clustered to promote rest as pt did not rest on the previous night.  Call light in reach.

## 2021-11-01 ENCOUNTER — LAB REQUISITION (OUTPATIENT)
Dept: LAB | Facility: CLINIC | Age: 56
End: 2021-11-01

## 2021-11-01 DIAGNOSIS — Z09 ENCOUNTER FOR FOLLOW-UP EXAMINATION AFTER COMPLETED TREATMENT FOR CONDITIONS OTHER THAN MALIGNANT NEOPLASM: ICD-10-CM

## 2021-11-01 LAB
ANION GAP SERPL CALCULATED.3IONS-SCNC: 11 MMOL/L (ref 5–18)
BUN SERPL-MCNC: 15 MG/DL (ref 8–22)
CALCIUM SERPL-MCNC: 9.4 MG/DL (ref 8.5–10.5)
CHLORIDE BLD-SCNC: 105 MMOL/L (ref 98–107)
CO2 SERPL-SCNC: 22 MMOL/L (ref 22–31)
CREAT SERPL-MCNC: 1.1 MG/DL (ref 0.7–1.3)
GFR SERPL CREATININE-BSD FRML MDRD: 74 ML/MIN/1.73M2
GLUCOSE BLD-MCNC: 93 MG/DL (ref 70–125)
POTASSIUM BLD-SCNC: 5 MMOL/L (ref 3.5–5)
SODIUM SERPL-SCNC: 138 MMOL/L (ref 136–145)

## 2021-11-01 PROCEDURE — 80048 BASIC METABOLIC PNL TOTAL CA: CPT | Performed by: FAMILY MEDICINE

## 2021-11-03 ENCOUNTER — OFFICE VISIT (OUTPATIENT)
Dept: SURGERY | Facility: CLINIC | Age: 56
End: 2021-11-03
Payer: COMMERCIAL

## 2021-11-03 ENCOUNTER — DOCUMENTATION ONLY (OUTPATIENT)
Dept: SURGERY | Facility: CLINIC | Age: 56
End: 2021-11-03

## 2021-11-03 VITALS — DIASTOLIC BLOOD PRESSURE: 82 MMHG | SYSTOLIC BLOOD PRESSURE: 118 MMHG | BODY MASS INDEX: 32.78 KG/M2 | WEIGHT: 235 LBS

## 2021-11-03 DIAGNOSIS — K42.0 INCARCERATED UMBILICAL HERNIA: Primary | ICD-10-CM

## 2021-11-03 PROCEDURE — 99024 POSTOP FOLLOW-UP VISIT: CPT | Performed by: SURGERY

## 2021-11-03 RX ORDER — ASCORBIC ACID 125 MG
TABLET,CHEWABLE ORAL
COMMUNITY
Start: 2021-10-29

## 2021-11-03 NOTE — PROGRESS NOTES
HPI: Pt is here for follow up of a Incarcerated Umb Hernia Repair.   he is doing well.  Pain is well controlled.  No difficulties with the surgical wound/wounds.  he is eating well and denies fever and chills.         /82   Wt 106.6 kg (235 lb)   BMI 32.78 kg/m      EXAM:  GENERAL:Appears well  ABDOMEN:  Soft, +BS  SURGICAL WOUNDS:  Incisions healing well, no enduration or drainage.      Assessment/Plan:  Doing well after surgery and should follow up as needed.  Given the recent UHR with no mesh, he should take all of 3 weeks off before returning to work.    Kevan Ramos MD ,MD  [facility], [facility]: Department of Surgery

## 2021-11-03 NOTE — PROGRESS NOTES
Patient brought in workability form [facility] [employer], completed.  Per Rachel return to work date 11/15/21    Patients sister is concerned about patient returning to work due to long hours and heavy lifting. Reassured her that time can be extended if needed as the RTW date approaches.

## 2021-11-03 NOTE — LETTER
11/3/2021         RE: Leon Gresham  [address] [place] [facility] [address]        Dear Colleague,    Thank you for referring your patient, Leon Gresham, to the [facility]. Please see a copy of my visit note below.    HPI: Pt is here for follow up of a Incarcerated Umb Hernia Repair.   he is doing well.  Pain is well controlled.  No difficulties with the surgical wound/wounds.  he is eating well and denies fever and chills.         /82   Wt 106.6 kg (235 lb)   BMI 32.78 kg/m      EXAM:  GENERAL:Appears well  ABDOMEN:  Soft, +BS  SURGICAL WOUNDS:  Incisions healing well, no enduration or drainage.      Assessment/Plan:  Doing well after surgery and should follow up as needed.  Given the recent UHR with no mesh, he should take all of 3 weeks off before returning to work.    Kevan Ramos MD ,MD  [facility]: Department of Surgery        Again, thank you for allowing me to participate in the care of your patient.        Sincerely,        Kevan Ramos MD

## 2021-11-11 ENCOUNTER — OFFICE VISIT (OUTPATIENT)
Dept: FAMILY MEDICINE | Facility: CLINIC | Age: 56
End: 2021-11-11
Payer: COMMERCIAL

## 2021-11-11 VITALS
SYSTOLIC BLOOD PRESSURE: 152 MMHG | OXYGEN SATURATION: 95 % | WEIGHT: 235.01 LBS | BODY MASS INDEX: 32.78 KG/M2 | HEART RATE: 70 BPM | RESPIRATION RATE: 18 BRPM | DIASTOLIC BLOOD PRESSURE: 88 MMHG | TEMPERATURE: 97.7 F

## 2021-11-11 DIAGNOSIS — J20.9 ACUTE BRONCHITIS, UNSPECIFIED ORGANISM: Primary | ICD-10-CM

## 2021-11-11 PROCEDURE — 99214 OFFICE O/P EST MOD 30 MIN: CPT | Mod: 24 | Performed by: FAMILY MEDICINE

## 2021-11-11 RX ORDER — ALBUTEROL SULFATE 90 UG/1
2 AEROSOL, METERED RESPIRATORY (INHALATION) EVERY 6 HOURS
Qty: 18 G | Refills: 3 | Status: SHIPPED | OUTPATIENT
Start: 2021-11-11 | End: 2022-04-26

## 2021-11-11 RX ORDER — AZITHROMYCIN 250 MG/1
TABLET, FILM COATED ORAL
Qty: 6 TABLET | Refills: 0 | Status: SHIPPED | OUTPATIENT
Start: 2021-11-11 | End: 2021-11-16

## 2021-11-11 NOTE — PROGRESS NOTES
OUTPATIENT VISIT NOTE                                                   Date of Visit: 11/11/2021     Chief Complaint   Patient presents with:  Cough: cough, congestion and vomitting since 10/27/21 after surgery - took cough medicine and albuterol             History of Present Illness   Leon Gresham is a 56 year old male with mother has had a cough.  Has had off and on for over a month.  Has gotten worse over the last week.  No fever.  Occasionally productive.  Yellowish sputum.  No shortness of breath.  No pain in legs.  No swelling in legs.  Has post nasal drainage associated with the cough.  No facial pressure.    Quit smoking about 1.5 years.      Left ear feels plugged up    Has had covid vaccination.    No known exposures.    Using robitussin.    Had umbilical hernia repair on 10/24/21.           MEDICATIONS   Current Outpatient Medications   Medication     acetaminophen (TYLENOL) 325 MG tablet     albuterol (PROAIR HFA/PROVENTIL HFA/VENTOLIN HFA) 108 (90 Base) MCG/ACT inhaler     ARMOUR THYROID 240 MG tablet     azithromycin (ZITHROMAX) 250 MG tablet     lisinopril (ZESTRIL) 10 MG tablet     lysine 500 MG TABS     BL CALCIUM-MAGNESIUM-ZINC PO     Magnesium Citrate 125 MG CAPS     Multiple Vitamin (MULTI VITAMIN) TABS     No current facility-administered medications for this visit.         SOCIAL HISTORY   Social History     Tobacco Use     Smoking status: Former Smoker     Smokeless tobacco: Never Used   Substance Use Topics     Alcohol use: Not on file           Physical Exam   Vitals:    11/11/21 1237   BP: (!) 152/88   Pulse: 70   Resp: 18   Temp: 97.7  F (36.5  C)   TempSrc: Oral   SpO2: 95%   Weight: 106.6 kg (235 lb 0.2 oz)        GENERAL:   Alert. Oriented.  EYES: Clear  HENT:  Ears: R TM pearly gray. Normal landmarks. L TM pearly gray.  Normal landmarks  Nose: Clear.  Sinuses: Nontender.  Oropharynx:  No erythema. No exudate.  NECK: Supple. No adenopathy.  LUNGS: coarse Rhonchi.  Occasional  wheeze  HEART: RRR  SKIN:  No rash.      Diagnostic Studies   LABS:  Results for orders placed or performed in visit on 11/11/21   XR Chest 2 Views     Status: None    Narrative    EXAM DATE:         11/11/2021    EXAM: X-RAY CHEST, 2 VIEWS, FRONTAL AND LATERAL  LOCATION: [facility]  DATE/TIME: 11/11/2021 1:15 PM    INDICATION: Cough.  COMPARISON: None.    IMPRESSION: Negative chest. Lungs are clear.                      Assessment and Plan     Acute bronchitis, unspecified organism  With rhonchi and some wheezing will treat for bronchitis.  Antibiotic as ordered.  Albuterol inhaler tid for 1-2 weeks, then as needed.  Recheck if worsening or not improving.  - XR Chest 2 Views  - azithromycin (ZITHROMAX) 250 MG tablet  Dispense: 6 tablet; Refill: 0  - albuterol (PROAIR HFA/PROVENTIL HFA/VENTOLIN HFA) 108 (90 Base) MCG/ACT inhaler  Dispense: 18 g; Refill: 3                   Discussed signs / symptoms that warrant urgent / emergent medical attention.     Recheck if worsening or not improving.       Hazel Mcmahon MD          Pertinent History     The following portions of the patient's history were reviewed and updated as appropriate: allergies, current medications, past family history, past medical history, past social history, past surgical history and problem list.

## 2021-11-15 NOTE — PROGRESS NOTES
Called and spoke with pt about 2 separate forms that were dropped off.     Completed with RTW date as 11/15/2021.   Pt also mentioned he was trying to form a WC claim.     Completed paperwork from the [employer] and faxed to #[phone].     Completed paperwork from [address] but fax number that was given to me (#[phone]) did not go through x3. - I have called pt back x3 but no answer and VM is not set up.     Pt requested that I drop them off up front for him to . I will keep them at my Desk for now due to the one not able to be faxed. Filled in co worker Gisselle on the situation.

## 2021-11-16 ENCOUNTER — TELEPHONE (OUTPATIENT)
Dept: SURGERY | Facility: CLINIC | Age: 56
End: 2021-11-16
Payer: COMMERCIAL

## 2021-11-16 NOTE — TELEPHONE ENCOUNTER
Gerhard called he said he has been working [employer] regarding paperwork for his work and [employer] that were being faxed and then left at the .  He said he missed a call and is calling back to find out the status.    Please call Gerhard and advise.

## 2021-11-17 NOTE — TELEPHONE ENCOUNTER
Note from Gisselle that it has been completed and he came to pick it up.   Called pt to confirm.   No other questions.

## 2021-11-17 NOTE — PROGRESS NOTES
Pt came to  paperwork and gave new fax number ([phone]). Gisselle completed per note on paperwork.

## 2022-04-26 DIAGNOSIS — Z76.0 ENCOUNTER FOR MEDICATION REFILL: Primary | ICD-10-CM

## 2022-04-26 DIAGNOSIS — J20.9 ACUTE BRONCHITIS, UNSPECIFIED ORGANISM: ICD-10-CM

## 2022-04-26 NOTE — TELEPHONE ENCOUNTER
Incoming fax received from pharmacy to refill this medicaiton, but chart review indicates that patient used for acute bronchitis and so this order my no longer be indicated. Sending to MD for review. Please send if appropriate.

## 2022-04-27 RX ORDER — ALBUTEROL SULFATE 90 UG/1
2 AEROSOL, METERED RESPIRATORY (INHALATION) EVERY 6 HOURS
Qty: 18 G | Refills: 1 | Status: SHIPPED | OUTPATIENT
Start: 2022-04-27

## 2022-05-10 ENCOUNTER — LAB REQUISITION (OUTPATIENT)
Dept: LAB | Facility: CLINIC | Age: 57
End: 2022-05-10

## 2022-05-10 DIAGNOSIS — Z12.5 ENCOUNTER FOR SCREENING FOR MALIGNANT NEOPLASM OF PROSTATE: ICD-10-CM

## 2022-05-10 LAB — PSA SERPL-MCNC: 0.48 UG/L (ref 0–3.5)

## 2022-05-10 PROCEDURE — G0103 PSA SCREENING: HCPCS | Performed by: FAMILY MEDICINE

## 2022-09-13 ENCOUNTER — LAB REQUISITION (OUTPATIENT)
Dept: LAB | Facility: CLINIC | Age: 57
End: 2022-09-13

## 2022-09-13 DIAGNOSIS — Z13.220 ENCOUNTER FOR SCREENING FOR LIPOID DISORDERS: ICD-10-CM

## 2022-09-13 DIAGNOSIS — E03.9 HYPOTHYROIDISM, UNSPECIFIED: ICD-10-CM

## 2022-09-13 DIAGNOSIS — I10 ESSENTIAL (PRIMARY) HYPERTENSION: ICD-10-CM

## 2022-09-13 LAB
ANION GAP SERPL CALCULATED.3IONS-SCNC: 8 MMOL/L (ref 7–15)
BUN SERPL-MCNC: 23.5 MG/DL (ref 8–23)
CALCIUM SERPL-MCNC: 9.4 MG/DL (ref 8.6–10)
CHLORIDE SERPL-SCNC: 101 MMOL/L (ref 98–107)
CHOLEST SERPL-MCNC: 230 MG/DL
CREAT SERPL-MCNC: 1.22 MG/DL (ref 0.67–1.17)
DEPRECATED HCO3 PLAS-SCNC: 30 MMOL/L (ref 22–29)
GFR SERPL CREATININE-BSD FRML MDRD: 68 ML/MIN/1.73M2
GLUCOSE SERPL-MCNC: 88 MG/DL (ref 70–99)
HDLC SERPL-MCNC: 50 MG/DL
LDLC SERPL CALC-MCNC: 159 MG/DL
NONHDLC SERPL-MCNC: 180 MG/DL
POTASSIUM SERPL-SCNC: 4.7 MMOL/L (ref 3.4–5.3)
SODIUM SERPL-SCNC: 139 MMOL/L (ref 136–145)
T4 FREE SERPL-MCNC: 0.91 NG/DL (ref 0.9–1.7)
TRIGL SERPL-MCNC: 106 MG/DL
TSH SERPL DL<=0.005 MIU/L-ACNC: 8.16 UIU/ML (ref 0.3–4.2)

## 2022-09-13 PROCEDURE — 84439 ASSAY OF FREE THYROXINE: CPT | Performed by: FAMILY MEDICINE

## 2022-09-13 PROCEDURE — 80061 LIPID PANEL: CPT | Performed by: FAMILY MEDICINE

## 2022-09-13 PROCEDURE — 84443 ASSAY THYROID STIM HORMONE: CPT | Performed by: FAMILY MEDICINE

## 2022-09-13 PROCEDURE — 80048 BASIC METABOLIC PNL TOTAL CA: CPT | Performed by: FAMILY MEDICINE

## 2022-11-05 ENCOUNTER — NURSE TRIAGE (OUTPATIENT)
Dept: NURSING | Facility: CLINIC | Age: 57
End: 2022-11-05

## 2022-11-05 NOTE — TELEPHONE ENCOUNTER
Pt is phoning stating that his pulse oximeter is running 93% on room air    No coughing - pt states that his chest will feel tight some times - comes and goes     Able to take a deep breath     No wheezing and no stridor     No fever     Per Disposition: See PCP Within 2 Weeks    Care advice given per protocol and when to call back. Pt verbalized understanding and agrees to plan of care.    Charissa Tatum RN  [facility] Nurse Advisor  12:48 PM 11/5/2022      COVID 19 Nurse Triage Plan/Patient Instructions    Please be aware that novel coronavirus (COVID-19) may be circulating in the community. If you develop symptoms such as fever, cough, or SOB or if you have concerns about the presence of another infection including coronavirus (COVID-19), please contact your health care provider or visit [email or web address].     Disposition/Instructions    In-Person Visit with provider recommended. Reference Visit Selection Guide.    Thank you for taking steps to prevent the spread of this virus.  o Limit your contact with others.  o Wear a simple mask to cover your cough.  o Wash your hands well and often.    Resources    [facility]: About COVID-19: [email or web address]    CDC: What to Do If You're Sick: www.cdc.gov/coronavirus/2019-ncov/about/steps-when-sick.html    CDC: Ending Home Isolation: www.cdc.gov/coronavirus/2019-ncov/hcp/disposition-in-home-patients.html     CDC: Caring for Someone: www.cdc.gov/coronavirus/2019-ncov/if-you-are-sick/care-for-someone.html     [facility]: Interim Guidance for Hospital Discharge to Home: www.health.[facility].[place]/diseases/coronavirus/hcp/hospdischarge.pdf    [facility] clinical trials (COVID-19 research studies): [email or web address]     Below are the COVID-19 hotlines at the [place] Department of [facility] ([facility]). Interpreters are available.   o For health questions: Call 966-862-2536 or 1-471.987.4571 (7 a.m. to 7 p.m.)  o For  "questions about schools and childcare: Call 275-992-7955 or 1-939.301.4342 (7 a.m. to 7 p.m.)                           Reason for Disposition    [1] MILD longstanding difficulty breathing AND [2]  SAME as normal    Additional Information    Negative: SEVERE difficulty breathing (e.g., struggling for each breath, speaks in single words)    Negative: [1] Breathing stopped AND [2] hasn't returned    Negative: Choking on something    Negative: Bluish (or gray) lips or face now    Negative: Difficult to awaken or acting confused (e.g., disoriented, slurred speech)    Negative: Passed out (i.e., lost consciousness, collapsed and was not responding)    Negative: Wheezing started suddenly after medicine, an allergic food or bee sting    Negative: Stridor    Negative: Slow, shallow and weak breathing    Negative: Sounds like a life-threatening emergency to the triager    Negative: Chest pain    Negative: [1] Wheezing (high pitched whistling sound) AND [2] previous asthma attacks or use of asthma medicines    Negative: [1] Difficulty breathing AND [2] only present when coughing    Negative: [1] Difficulty breathing AND [2] only from stuffy or runny nose    Negative: [1] Difficulty breathing AND [2] within 14 days of COVID-19 Exposure    Negative: [1] MODERATE difficulty breathing (e.g., speaks in phrases, SOB even at rest, pulse 100-120) AND [2] NEW-onset or WORSE than normal    Negative: Oxygen level (e.g., pulse oximetry) 90 percent or lower    Negative: Wheezing can be heard across the room    Negative: Drooling or spitting out saliva (because can't swallow)    Negative: History of prior \"blood clot\" in leg or lungs (i.e., deep vein thrombosis, pulmonary embolism)    Negative: History of inherited increased risk of blood clots (e.g., Factor 5 Leiden, Anti-thrombin 3, Protein C or Protein S deficiency, Prothrombin mutation)    Negative: Major surgery in the past month    Negative: Hip or leg fracture (broken bone) in past " "month (or had cast on leg or ankle in past month)    Negative: Illness requiring prolonged bedrest in past month (e.g., immobilization, long hospital stay)    Negative: Long-distance travel in past month (e.g., car, bus, train, plane; with trip lasting 6 or more hours)    Negative: Cancer treatment in past six months (or has cancer now)    Negative: Extra heart beats OR irregular heart beating  (i.e., \"palpitations\")    Negative: Fever > 103 F (39.4 C)    Negative: [1] Fever > 101 F (38.3 C) AND [2] age > 60 years    Negative: [1] Fever > 100.0 F (37.8 C) AND [2] bedridden (e.g., nursing home patient, CVA, chronic illness, recovering from surgery)    Negative: [1] Fever > 100.0 F (37.8 C) AND [2] diabetes mellitus or weak immune system (e.g., HIV positive, cancer chemo, splenectomy, organ transplant, chronic steroids)    Negative: [1] Periods where breathing stops and then resumes normally AND [2] bedridden (e.g., nursing home patient, CVA)    Negative: Pregnant or postpartum (from 0 to 6 weeks after delivery)    Negative: Patient sounds very sick or weak to the triager    Negative: [1] MILD difficulty breathing (e.g., minimal/no SOB at rest, SOB with walking, pulse <100) AND [2] NEW-onset or WORSE than normal    Negative: [1] Longstanding difficulty breathing (e.g., CHF, COPD, emphysema) AND [2] WORSE than normal    Negative: [1] Longstanding difficulty breathing AND [2] not responding to usual therapy    Negative: Continuous (nonstop) coughing interferes with work, school, or sleeping    Negative: Oxygen level (e.g., pulse oximetry) 91 to 94 percent    Negative: [1] MODERATE longstanding difficulty breathing (e.g., speaks in phrases, SOB even at rest, pulse 100-120) AND [2] SAME as normal    Protocols used: BREATHING DIFFICULTY-A-AH      "

## 2023-01-31 ENCOUNTER — LAB REQUISITION (OUTPATIENT)
Dept: LAB | Facility: CLINIC | Age: 58
End: 2023-01-31

## 2023-01-31 DIAGNOSIS — E78.2 MIXED HYPERLIPIDEMIA: ICD-10-CM

## 2023-01-31 DIAGNOSIS — E03.9 HYPOTHYROIDISM, UNSPECIFIED: ICD-10-CM

## 2023-01-31 LAB
ALBUMIN SERPL BCG-MCNC: 4.2 G/DL (ref 3.5–5.2)
ALP SERPL-CCNC: 94 U/L (ref 40–129)
ALT SERPL W P-5'-P-CCNC: 30 U/L (ref 10–50)
ANION GAP SERPL CALCULATED.3IONS-SCNC: 10 MMOL/L (ref 7–15)
AST SERPL W P-5'-P-CCNC: 27 U/L (ref 10–50)
BILIRUB SERPL-MCNC: 0.3 MG/DL
BUN SERPL-MCNC: 21.8 MG/DL (ref 8–23)
CALCIUM SERPL-MCNC: 9.4 MG/DL (ref 8.6–10)
CHLORIDE SERPL-SCNC: 104 MMOL/L (ref 98–107)
CREAT SERPL-MCNC: 1.07 MG/DL (ref 0.67–1.17)
DEPRECATED HCO3 PLAS-SCNC: 24 MMOL/L (ref 22–29)
GFR SERPL CREATININE-BSD FRML MDRD: 80 ML/MIN/1.73M2
GLUCOSE SERPL-MCNC: 90 MG/DL (ref 70–99)
LDLC SERPL DIRECT ASSAY-MCNC: 105 MG/DL
POTASSIUM SERPL-SCNC: 4.4 MMOL/L (ref 3.4–5.3)
PROT SERPL-MCNC: 6.3 G/DL (ref 6.4–8.3)
SODIUM SERPL-SCNC: 138 MMOL/L (ref 136–145)
T4 FREE SERPL-MCNC: 0.9 NG/DL (ref 0.9–1.7)
TSH SERPL DL<=0.005 MIU/L-ACNC: 0.15 UIU/ML (ref 0.3–4.2)

## 2023-01-31 PROCEDURE — 84439 ASSAY OF FREE THYROXINE: CPT | Performed by: FAMILY MEDICINE

## 2023-01-31 PROCEDURE — 84443 ASSAY THYROID STIM HORMONE: CPT | Performed by: FAMILY MEDICINE

## 2023-01-31 PROCEDURE — 80053 COMPREHEN METABOLIC PANEL: CPT | Performed by: FAMILY MEDICINE

## 2023-01-31 PROCEDURE — 83721 ASSAY OF BLOOD LIPOPROTEIN: CPT | Performed by: FAMILY MEDICINE

## 2023-04-27 ENCOUNTER — OFFICE VISIT (OUTPATIENT)
Dept: FAMILY MEDICINE | Facility: CLINIC | Age: 58
End: 2023-04-27
Payer: COMMERCIAL

## 2023-04-27 ENCOUNTER — HOSPITAL ENCOUNTER (OUTPATIENT)
Dept: GENERAL RADIOLOGY | Facility: HOSPITAL | Age: 58
Discharge: HOME | End: 2023-04-27
Attending: PHYSICIAN ASSISTANT | Admitting: PHYSICIAN ASSISTANT
Payer: COMMERCIAL

## 2023-04-27 VITALS
SYSTOLIC BLOOD PRESSURE: 122 MMHG | OXYGEN SATURATION: 90 % | RESPIRATION RATE: 18 BRPM | DIASTOLIC BLOOD PRESSURE: 78 MMHG | HEART RATE: 87 BPM | TEMPERATURE: 98.4 F

## 2023-04-27 DIAGNOSIS — J44.9 CHRONIC OBSTRUCTIVE PULMONARY DISEASE, UNSPECIFIED COPD TYPE (H): Primary | ICD-10-CM

## 2023-04-27 DIAGNOSIS — J44.9 CHRONIC OBSTRUCTIVE PULMONARY DISEASE, UNSPECIFIED COPD TYPE (H): ICD-10-CM

## 2023-04-27 PROCEDURE — 71046 X-RAY EXAM CHEST 2 VIEWS: CPT

## 2023-04-27 PROCEDURE — 99214 OFFICE O/P EST MOD 30 MIN: CPT | Performed by: PHYSICIAN ASSISTANT

## 2023-04-27 RX ORDER — MONTELUKAST SODIUM 10 MG/1
1 TABLET ORAL
COMMUNITY
Start: 2023-03-09

## 2023-04-27 RX ORDER — FLUTICASONE PROPIONATE AND SALMETEROL XINAFOATE 230; 21 UG/1; UG/1
2 AEROSOL, METERED RESPIRATORY (INHALATION) 2 TIMES DAILY
Qty: 12 G | Refills: 0 | Status: SHIPPED | OUTPATIENT
Start: 2023-04-27

## 2023-04-27 RX ORDER — DOXYCYCLINE 100 MG/1
100 CAPSULE ORAL 2 TIMES DAILY
Qty: 20 CAPSULE | Refills: 0 | Status: SHIPPED | OUTPATIENT
Start: 2023-04-27 | End: 2023-05-07

## 2023-04-27 RX ORDER — ALBUTEROL SULFATE 90 UG/1
2 AEROSOL, METERED RESPIRATORY (INHALATION) EVERY 6 HOURS PRN
Qty: 18 G | Refills: 0 | Status: SHIPPED | OUTPATIENT
Start: 2023-04-27

## 2023-04-27 RX ORDER — PREDNISONE 20 MG/1
60 TABLET ORAL DAILY
Qty: 15 TABLET | Refills: 0 | Status: SHIPPED | OUTPATIENT
Start: 2023-04-27 | End: 2023-05-02

## 2023-04-27 NOTE — LETTER
April 27, 2023      Leon Gresham  [address] [place] [address]        To Whom It May Concern:    Leon Gresham  was seen on 4/27/23.  Please excuse his absence from work today due to illness.         Sincerely,        Clau Denney PA-C

## 2023-04-27 NOTE — PROGRESS NOTES
Patient presents with:  Breathing Problem: After working was sob and runny nose taking allergie medicine took left over predisone chest feels tight and O2 at home in 80s low 90s       Clinical Decision Making:  Patient experiencing very diffuse wheezing.  Given tobacco history and wheezes I suspect he may have underlying COPD.  Chest x-ray is supportive of this diagnosis today.  We discussed potential for emergency evaluation given oxygen saturations bouncing between 89 and 90%, but patient declines at today.  Patient started on Advair, prednisone, doxycycline, and albuterol was refilled.  He will seek emergency medical attention if shortness of breath worsens.      ICD-10-CM    1. Chronic obstructive pulmonary disease, unspecified COPD type (H)  J44.9 fluticasone-salmeterol (ADVAIR-HFA) 230-21 MCG/ACT inhaler     predniSONE (DELTASONE) 20 MG tablet     doxycycline hyclate (VIBRAMYCIN) 100 MG capsule     albuterol (PROAIR HFA/PROVENTIL HFA/VENTOLIN HFA) 108 (90 Base) MCG/ACT inhaler     XR Chest 2 Views     CANCELED: XR Chest 2 Views          Patient Instructions   1. Take Doxycycline twice per day. Avoid taking Magnesium supplement while taking this medicine.   2. Take Prednisone 3 tablets daily for the next 5 days. Take this in the morning and with food.   3. Restart Advair. Continue with Albuterol every 4-6 hours.   4. Seek emergency medical attention if your shortness of breath worsens at all.       HPI:  Leon Gresham is a 58 year old male with past medical history of hypertension, obesity, tobacco use who presents today complaining of shortness of breath and runny nose.  Patient's primary care provider is not within this system.  About a month ago patient was in [place] and went to an urgent care and was prescribed albuterol and Doxycycline for treatment.  Last night patient took a Claritin and it made him have a headache.  He took Benadryl afterwards.  He tends to have flareups like this during allergy  season.  He has previously been on Advair, but was taken off of it because he no longer needed it.  He has not any recent fevers.    History obtained from the patient.    Problem List:  2021-10: RICH (acute kidney injury) (H)  2021-10: Acquired atrophy of thyroid  2021-10: Candidiasis of skin and nails  2021-10: Hypertension  2021-10: Hypothyroidism  2021-10: Obesity  2021-10: Tobacco user  2021-10: Umbilical hernia with obstruction      Past Medical History:   Diagnosis Date     Hypertension      Thyroid disease        Social History     Tobacco Use     Smoking status: Former     Smokeless tobacco: Never   Vaping Use     Vaping status: Not on file   Substance Use Topics     Alcohol use: Not on file       Review of Systems    Vitals:    04/27/23 1011   BP: 122/78   Pulse: 87   Resp: 18   Temp: 98.4  F (36.9  C)   TempSrc: Oral   SpO2: 90%       Physical Exam  Vitals and nursing note reviewed.   Constitutional:       General: He is not in acute distress.     Appearance: He is not toxic-appearing or diaphoretic.   HENT:      Head: Normocephalic and atraumatic.      Right Ear: External ear normal.      Left Ear: External ear normal.   Eyes:      Conjunctiva/sclera: Conjunctivae normal.   Cardiovascular:      Rate and Rhythm: Normal rate and regular rhythm.      Heart sounds: No murmur heard.  Pulmonary:      Effort: Pulmonary effort is normal. No respiratory distress.      Breath sounds: No stridor. Wheezing present. No rhonchi or rales.   Neurological:      Mental Status: He is alert.   Psychiatric:         Mood and Affect: Mood normal.         Behavior: Behavior normal.         Thought Content: Thought content normal.         Judgment: Judgment normal.         Results:  Results for orders placed or performed during the hospital encounter of 04/27/23   XR Chest 2 Views     Status: None    Narrative    EXAM: XR CHEST 2 VIEWS  LOCATION: [facility]  DATE/TIME: 4/27/2023 11:05 AM CDT    INDICATION:  Cough. Shortness of breath.  COMPARISON: Chest radiograph 09/13/2022.      Impression    IMPRESSION:    Slightly hyperinflated lungs, which can be seen with COPD. No airspace opacities, pleural effusions, or pneumothorax. Normal pulmonary vascularity.    Stable, nonenlarged cardiac silhouette.         At the end of the encounter, I discussed results, diagnosis, medications. Discussed red flags for immediate return to clinic/ER, as well as indications for follow up if no improvement. Patient understood and agreed to plan. Patient was stable for discharge.

## 2023-04-27 NOTE — PATIENT INSTRUCTIONS
Take Doxycycline twice per day. Avoid taking Magnesium supplement while taking this medicine.   Take Prednisone 3 tablets daily for the next 5 days. Take this in the morning and with food.   Restart Advair. Continue with Albuterol every 4-6 hours.   Seek emergency medical attention if your shortness of breath worsens at all.

## 2023-09-15 ENCOUNTER — LAB REQUISITION (OUTPATIENT)
Dept: LAB | Facility: CLINIC | Age: 58
End: 2023-09-15

## 2023-09-15 DIAGNOSIS — E78.2 MIXED HYPERLIPIDEMIA: ICD-10-CM

## 2023-09-15 DIAGNOSIS — E03.9 HYPOTHYROIDISM, UNSPECIFIED: ICD-10-CM

## 2023-09-15 LAB
CHOLEST SERPL-MCNC: 178 MG/DL
HDLC SERPL-MCNC: 34 MG/DL
LDLC SERPL CALC-MCNC: 111 MG/DL
NONHDLC SERPL-MCNC: 144 MG/DL
T4 FREE SERPL-MCNC: 1.23 NG/DL (ref 0.9–1.7)
TRIGL SERPL-MCNC: 163 MG/DL
TSH SERPL DL<=0.005 MIU/L-ACNC: 0.06 UIU/ML (ref 0.3–4.2)

## 2023-09-15 PROCEDURE — 84439 ASSAY OF FREE THYROXINE: CPT | Performed by: FAMILY MEDICINE

## 2023-09-15 PROCEDURE — 84443 ASSAY THYROID STIM HORMONE: CPT | Performed by: FAMILY MEDICINE

## 2023-09-15 PROCEDURE — 80061 LIPID PANEL: CPT | Performed by: FAMILY MEDICINE

## 2023-11-17 ENCOUNTER — LAB REQUISITION (OUTPATIENT)
Dept: LAB | Facility: CLINIC | Age: 58
End: 2023-11-17

## 2023-11-17 DIAGNOSIS — E03.9 HYPOTHYROIDISM, UNSPECIFIED: ICD-10-CM

## 2023-11-17 LAB
T4 FREE SERPL-MCNC: 0.85 NG/DL (ref 0.9–1.7)
TSH SERPL DL<=0.005 MIU/L-ACNC: 0.02 UIU/ML (ref 0.3–4.2)

## 2023-11-17 PROCEDURE — 84443 ASSAY THYROID STIM HORMONE: CPT | Performed by: FAMILY MEDICINE

## 2023-11-17 PROCEDURE — 84439 ASSAY OF FREE THYROXINE: CPT | Performed by: FAMILY MEDICINE

## 2024-02-14 ENCOUNTER — LAB REQUISITION (OUTPATIENT)
Dept: LAB | Facility: CLINIC | Age: 59
End: 2024-02-14

## 2024-02-14 DIAGNOSIS — E78.2 MIXED HYPERLIPIDEMIA: ICD-10-CM

## 2024-02-14 DIAGNOSIS — E03.9 HYPOTHYROIDISM, UNSPECIFIED: ICD-10-CM

## 2024-02-14 DIAGNOSIS — I10 ESSENTIAL (PRIMARY) HYPERTENSION: ICD-10-CM

## 2024-02-14 LAB
ANION GAP SERPL CALCULATED.3IONS-SCNC: 12 MMOL/L (ref 7–15)
BUN SERPL-MCNC: 26 MG/DL (ref 8–23)
CALCIUM SERPL-MCNC: 9 MG/DL (ref 8.8–10.2)
CHLORIDE SERPL-SCNC: 104 MMOL/L (ref 98–107)
CREAT SERPL-MCNC: 1.04 MG/DL (ref 0.67–1.17)
DEPRECATED HCO3 PLAS-SCNC: 21 MMOL/L (ref 22–29)
EGFRCR SERPLBLD CKD-EPI 2021: 82 ML/MIN/1.73M2
GLUCOSE SERPL-MCNC: 94 MG/DL (ref 70–99)
LDLC SERPL DIRECT ASSAY-MCNC: 156 MG/DL
POTASSIUM SERPL-SCNC: 4.1 MMOL/L (ref 3.4–5.3)
SODIUM SERPL-SCNC: 137 MMOL/L (ref 135–145)
T4 FREE SERPL-MCNC: 0.78 NG/DL (ref 0.9–1.7)
TSH SERPL DL<=0.005 MIU/L-ACNC: 9.44 UIU/ML (ref 0.3–4.2)

## 2024-02-14 PROCEDURE — 84439 ASSAY OF FREE THYROXINE: CPT | Performed by: FAMILY MEDICINE

## 2024-02-14 PROCEDURE — 84443 ASSAY THYROID STIM HORMONE: CPT | Performed by: FAMILY MEDICINE

## 2024-02-14 PROCEDURE — 80048 BASIC METABOLIC PNL TOTAL CA: CPT | Performed by: FAMILY MEDICINE

## 2024-02-14 PROCEDURE — 83721 ASSAY OF BLOOD LIPOPROTEIN: CPT | Performed by: FAMILY MEDICINE

## 2024-04-16 ENCOUNTER — LAB REQUISITION (OUTPATIENT)
Dept: LAB | Facility: CLINIC | Age: 59
End: 2024-04-16

## 2024-04-16 DIAGNOSIS — E03.9 HYPOTHYROIDISM, UNSPECIFIED: ICD-10-CM

## 2024-04-16 PROCEDURE — 84443 ASSAY THYROID STIM HORMONE: CPT | Performed by: FAMILY MEDICINE

## 2024-04-17 LAB — TSH SERPL DL<=0.005 MIU/L-ACNC: 1.97 UIU/ML (ref 0.3–4.2)

## 2024-05-26 ENCOUNTER — OFFICE VISIT (OUTPATIENT)
Dept: FAMILY MEDICINE | Facility: CLINIC | Age: 59
End: 2024-05-26
Payer: COMMERCIAL

## 2024-05-26 ENCOUNTER — HOSPITAL ENCOUNTER (OUTPATIENT)
Dept: GENERAL RADIOLOGY | Facility: HOSPITAL | Age: 59
Discharge: HOME | End: 2024-05-26
Attending: FAMILY MEDICINE | Admitting: FAMILY MEDICINE
Payer: COMMERCIAL

## 2024-05-26 VITALS
HEART RATE: 67 BPM | RESPIRATION RATE: 18 BRPM | OXYGEN SATURATION: 97 % | WEIGHT: 240.8 LBS | SYSTOLIC BLOOD PRESSURE: 119 MMHG | DIASTOLIC BLOOD PRESSURE: 75 MMHG | BODY MASS INDEX: 33.58 KG/M2 | TEMPERATURE: 98.3 F

## 2024-05-26 DIAGNOSIS — J45.21 MILD INTERMITTENT ASTHMA WITH EXACERBATION: Primary | ICD-10-CM

## 2024-05-26 DIAGNOSIS — R05.1 ACUTE COUGH: ICD-10-CM

## 2024-05-26 DIAGNOSIS — J45.21 MILD INTERMITTENT ASTHMA WITH EXACERBATION: ICD-10-CM

## 2024-05-26 PROCEDURE — 99214 OFFICE O/P EST MOD 30 MIN: CPT | Performed by: FAMILY MEDICINE

## 2024-05-26 PROCEDURE — 71046 X-RAY EXAM CHEST 2 VIEWS: CPT

## 2024-05-26 RX ORDER — PREDNISONE 20 MG/1
60 TABLET ORAL DAILY
Qty: 15 TABLET | Refills: 0 | Status: SHIPPED | OUTPATIENT
Start: 2024-05-26 | End: 2024-05-31

## 2024-05-26 RX ORDER — BENZONATATE 200 MG/1
200 CAPSULE ORAL 3 TIMES DAILY PRN
Qty: 30 CAPSULE | Refills: 0 | Status: SHIPPED | OUTPATIENT
Start: 2024-05-26

## 2024-05-26 NOTE — PROGRESS NOTES
Assessment:       Mild intermittent asthma with exacerbation  - XR Chest 2 Views  - predniSONE (DELTASONE) 20 MG tablet  Dispense: 15 tablet; Refill: 0  - benzonatate (TESSALON) 200 MG capsule  Dispense: 30 capsule; Refill: 0    Acute cough         Plan:     Symptoms consistent with mild intermittent asthma with exacerbation.  Chest x-ray ordered and personally reviewed by myself showing no evidence of infiltrate that I can appreciate.  Prescription given for prednisone burst and Tessalon Perles to take as needed.  Also continue on albuterol inhaler as needed.  Follow-up if symptoms getting worse or not improving as expected over the next 4 to 5 days.  No indication for antibiotics at this time.    MEDICATIONS:   Orders Placed This Encounter   Medications    predniSONE (DELTASONE) 20 MG tablet     Sig: Take 3 tablets (60 mg) by mouth daily for 5 days     Dispense:  15 tablet     Refill:  0    benzonatate (TESSALON) 200 MG capsule     Sig: Take 1 capsule (200 mg) by mouth 3 times daily as needed for cough     Dispense:  30 capsule     Refill:  0       Subjective:       59 year old male with a history of tobacco use and mild intermittent asthma presents for evaluation of a 2-week history of cough and wheezing.  He has been feeling rundown.  He does have an albuterol inhaler at home and has been taking his Advair inhaler which he had at home and this has not seemed to help.  He does not normally take Advair on a regular basis.  He has not had any fevers or chills and denies much nasal congestion.    Patient Active Problem List   Diagnosis    Acquired atrophy of thyroid    Candidiasis of skin and nails    Hypertension    Hypothyroidism    Obesity    Tobacco user    Umbilical hernia with obstruction    RICH (acute kidney injury) (H24)       Past Medical History:   Diagnosis Date    Hypertension     Thyroid disease        Past Surgical History:   Procedure Laterality Date    HERNIORRHAPHY UMBILICAL N/A 10/25/2021     Procedure: HERNIORRHAPHY, UMBILICAL, OPEN;  Surgeon: Kevan Ramos MD;  Location: [facility] OR       Current Outpatient Medications   Medication Sig Dispense Refill    acetaminophen (TYLENOL) 325 MG tablet Take 3 tablets (975 mg) by mouth every 8 hours      albuterol (PROAIR HFA/PROVENTIL HFA/VENTOLIN HFA) 108 (90 Base) MCG/ACT inhaler Inhale 2 puffs into the lungs every 6 hours as needed for shortness of breath, wheezing or cough 18 g 0    albuterol (PROAIR HFA/PROVENTIL HFA/VENTOLIN HFA) 108 (90 Base) MCG/ACT inhaler Inhale 2 puffs into the lungs every 6 hours 18 g 1    ARMOUR THYROID 240 MG tablet Take 240 mg by mouth daily      BL CALCIUM-MAGNESIUM-ZINC PO Calcium Magnesium Zinc 3 tabs      fluticasone-salmeterol (ADVAIR-HFA) 230-21 MCG/ACT inhaler Inhale 2 puffs into the lungs 2 times daily 12 g 0    lisinopril (ZESTRIL) 10 MG tablet Take 10 mg by mouth daily      lysine 500 MG TABS Take 500 mg by mouth 2 times daily      Magnesium Citrate 125 MG CAPS 1 cap      montelukast (SINGULAIR) 10 MG tablet Take 1 tablet by mouth daily at 2 pm      Multiple Vitamin (MULTI VITAMIN) TABS 1 tablet       No current facility-administered medications for this visit.       No Known Allergies    Family History   Problem Relation Age of Onset    Atrial fibrillation Mother     Asthma Mother        Social History     Socioeconomic History    Marital status: Single   Tobacco Use    Smoking status: Former    Smokeless tobacco: Never         Review of Systems  Pertinent items are noted in HPI.      Objective:                     General Appearance:    /75 (BP Location: Right arm, Patient Position: Sitting, Cuff Size: Adult Regular)   Pulse 67   Temp 98.3  F (36.8  C) (Oral)   Resp 18   Wt 109.2 kg (240 lb 12.8 oz)   SpO2 97%   BMI 33.58 kg/m          Alert, pleasant, cooperative, no distress, appears stated age   Head:    Normocephalic, without obvious abnormality, atraumatic   Eyes:    Conjunctiva/corneas  clear   Ears:    Normal TM's without erythema or bulging. Normal external ear canals, both ears   Nose:   Nares normal, septum midline, mucosa normal, no drainage    or sinus tenderness   Throat:   Lips, mucosa, and tongue normal; teeth and gums normal.  No tonsilar hypertrophy or exudate.   Neck:   Supple, symmetrical, trachea midline, no adenopathy    Lungs:   Expiratory wheezes heard throughout his lung fields bilaterally.  Lungs sound tight.  Difficulty here if there are any rhonchi secondary to extensive wheezing.    Heart:    Regular rate and rhythm, S1 and S2 normal, no murmur, rub or gallop       Extremities:   Extremities normal, atraumatic, no cyanosis or edema   Skin:   Skin color, texture, turgor normal, no rashes or lesions           Results for orders placed or performed during the hospital encounter of 05/26/24   XR Chest 2 Views     Status: None    Narrative    EXAM: XR CHEST 2 VIEWS  LOCATION: [facility] [place]  DATE: 5/26/2024    INDICATION:  Mild intermittent asthma with exacerbation  COMPARISON: 4/27/2023      Impression    IMPRESSION: No acute cardiopulmonary abnormality.       This note has been dictated using voice recognition software. Any grammatical or context distortions are unintentional and inherent to the software

## 2024-09-30 ENCOUNTER — LAB REQUISITION (OUTPATIENT)
Dept: LAB | Facility: CLINIC | Age: 59
End: 2024-09-30

## 2024-09-30 DIAGNOSIS — E03.9 HYPOTHYROIDISM, UNSPECIFIED: ICD-10-CM

## 2024-09-30 DIAGNOSIS — Z12.5 ENCOUNTER FOR SCREENING FOR MALIGNANT NEOPLASM OF PROSTATE: ICD-10-CM

## 2024-09-30 DIAGNOSIS — E78.2 MIXED HYPERLIPIDEMIA: ICD-10-CM

## 2024-09-30 LAB
LDLC SERPL DIRECT ASSAY-MCNC: 118 MG/DL
PSA SERPL DL<=0.01 NG/ML-MCNC: 0.75 NG/ML (ref 0–4.5)
TSH SERPL DL<=0.005 MIU/L-ACNC: 2.16 UIU/ML (ref 0.3–4.2)

## 2024-09-30 PROCEDURE — G0103 PSA SCREENING: HCPCS | Performed by: FAMILY MEDICINE

## 2024-09-30 PROCEDURE — 84443 ASSAY THYROID STIM HORMONE: CPT | Performed by: FAMILY MEDICINE

## 2024-09-30 PROCEDURE — 83721 ASSAY OF BLOOD LIPOPROTEIN: CPT | Performed by: FAMILY MEDICINE

## 2025-04-10 ENCOUNTER — LAB REQUISITION (OUTPATIENT)
Dept: LAB | Facility: CLINIC | Age: 60
End: 2025-04-10

## 2025-04-10 DIAGNOSIS — E78.2 MIXED HYPERLIPIDEMIA: ICD-10-CM

## 2025-04-10 LAB
ANION GAP SERPL CALCULATED.3IONS-SCNC: 12 MMOL/L (ref 7–15)
BUN SERPL-MCNC: 17.3 MG/DL (ref 8–23)
CALCIUM SERPL-MCNC: 9.4 MG/DL (ref 8.8–10.4)
CHLORIDE SERPL-SCNC: 105 MMOL/L (ref 98–107)
CHOLEST SERPL-MCNC: 174 MG/DL
CREAT SERPL-MCNC: 1.02 MG/DL (ref 0.67–1.17)
EGFRCR SERPLBLD CKD-EPI 2021: 83 ML/MIN/1.73M2
FASTING STATUS PATIENT QL REPORTED: YES
FASTING STATUS PATIENT QL REPORTED: YES
GLUCOSE SERPL-MCNC: 100 MG/DL (ref 70–99)
HCO3 SERPL-SCNC: 23 MMOL/L (ref 22–29)
HDLC SERPL-MCNC: 37 MG/DL
LDLC SERPL CALC-MCNC: 107 MG/DL
NONHDLC SERPL-MCNC: 137 MG/DL
POTASSIUM SERPL-SCNC: 4.3 MMOL/L (ref 3.4–5.3)
SODIUM SERPL-SCNC: 140 MMOL/L (ref 135–145)
T4 FREE SERPL-MCNC: 1.36 NG/DL (ref 0.9–1.7)
TRIGL SERPL-MCNC: 151 MG/DL
TSH SERPL DL<=0.005 MIU/L-ACNC: 0.1 UIU/ML (ref 0.3–4.2)

## 2025-04-10 PROCEDURE — 80048 BASIC METABOLIC PNL TOTAL CA: CPT | Performed by: FAMILY MEDICINE

## 2025-04-10 PROCEDURE — 82310 ASSAY OF CALCIUM: CPT | Performed by: FAMILY MEDICINE

## 2025-04-10 PROCEDURE — 84443 ASSAY THYROID STIM HORMONE: CPT | Performed by: FAMILY MEDICINE

## 2025-04-10 PROCEDURE — 84439 ASSAY OF FREE THYROXINE: CPT | Performed by: FAMILY MEDICINE

## 2025-04-10 PROCEDURE — 80061 LIPID PANEL: CPT | Performed by: FAMILY MEDICINE

## 2025-04-10 PROCEDURE — 82465 ASSAY BLD/SERUM CHOLESTEROL: CPT | Performed by: FAMILY MEDICINE

## 2025-08-27 ENCOUNTER — HOSPITAL ENCOUNTER (OUTPATIENT)
Dept: GENERAL RADIOLOGY | Facility: HOSPITAL | Age: 60
Discharge: HOME | End: 2025-08-27
Payer: COMMERCIAL

## 2025-08-27 ENCOUNTER — OFFICE VISIT (OUTPATIENT)
Dept: URGENT CARE | Facility: URGENT CARE | Age: 60
End: 2025-08-27
Payer: COMMERCIAL

## 2025-08-27 VITALS
TEMPERATURE: 98.5 F | SYSTOLIC BLOOD PRESSURE: 133 MMHG | HEART RATE: 62 BPM | OXYGEN SATURATION: 95 % | RESPIRATION RATE: 18 BRPM | DIASTOLIC BLOOD PRESSURE: 77 MMHG

## 2025-08-27 DIAGNOSIS — J45.41 MODERATE PERSISTENT ASTHMA WITH EXACERBATION: ICD-10-CM

## 2025-08-27 DIAGNOSIS — J44.9 CHRONIC OBSTRUCTIVE PULMONARY DISEASE, UNSPECIFIED COPD TYPE (H): ICD-10-CM

## 2025-08-27 DIAGNOSIS — L60.8 TOENAIL DEFORMITY: ICD-10-CM

## 2025-08-27 DIAGNOSIS — J45.41 MODERATE PERSISTENT ASTHMA WITH EXACERBATION: Primary | ICD-10-CM

## 2025-08-27 PROCEDURE — 3075F SYST BP GE 130 - 139MM HG: CPT

## 2025-08-27 PROCEDURE — 3078F DIAST BP <80 MM HG: CPT

## 2025-08-27 PROCEDURE — 99214 OFFICE O/P EST MOD 30 MIN: CPT | Mod: 25

## 2025-08-27 PROCEDURE — 94640 AIRWAY INHALATION TREATMENT: CPT

## 2025-08-27 PROCEDURE — 71046 X-RAY EXAM CHEST 2 VIEWS: CPT

## 2025-08-27 RX ORDER — FLUTICASONE PROPIONATE AND SALMETEROL XINAFOATE 230; 21 UG/1; UG/1
2 AEROSOL, METERED RESPIRATORY (INHALATION) 2 TIMES DAILY
Qty: 12 G | Refills: 0 | Status: SHIPPED | OUTPATIENT
Start: 2025-08-27

## 2025-08-27 RX ORDER — PREDNISONE 20 MG/1
40 TABLET ORAL ONCE
Status: COMPLETED | OUTPATIENT
Start: 2025-08-27 | End: 2025-08-27

## 2025-08-27 RX ORDER — IPRATROPIUM BROMIDE AND ALBUTEROL SULFATE 2.5; .5 MG/3ML; MG/3ML
3 SOLUTION RESPIRATORY (INHALATION)
Status: ACTIVE | OUTPATIENT
Start: 2025-08-27

## 2025-08-27 RX ORDER — PREDNISONE 20 MG/1
40 TABLET ORAL DAILY
Qty: 8 TABLET | Refills: 0 | Status: SHIPPED | OUTPATIENT
Start: 2025-08-27 | End: 2025-08-31

## 2025-08-27 RX ADMIN — IPRATROPIUM BROMIDE AND ALBUTEROL SULFATE 3 ML: 2.5; .5 SOLUTION RESPIRATORY (INHALATION) at 14:48

## 2025-08-27 RX ADMIN — PREDNISONE 40 MG: 20 TABLET ORAL at 14:47

## 2025-08-30 ENCOUNTER — HEALTH MAINTENANCE LETTER (OUTPATIENT)
Age: 60
End: 2025-08-30

## (undated) DEVICE — SUTURE VICRYL+ 2-0 27IN CT-1 UND VCP259H

## (undated) DEVICE — BLADE KNIFE SURG 15 371115

## (undated) DEVICE — DRSG GAUZE 4X4" 3033

## (undated) DEVICE — PREP DURAPREP 26ML APL 8630

## (undated) DEVICE — CUSTOM PACK GEN MAJOR SBA5BGMHEA

## (undated) DEVICE — PLATE GROUNDING ADULT W/CORD 9165L

## (undated) DEVICE — SOL WATER IRRIG 1000ML BOTTLE 2F7114

## (undated) DEVICE — DRSG TELFA 3X4" 1050

## (undated) DEVICE — DRAPE OR LAPAROTOMY KC 89228*

## (undated) DEVICE — SOL NACL 0.9% IRRIG 1000ML BOTTLE 2F7124

## (undated) DEVICE — DRSG TEGADERM 4X4 3/4" 1626W

## (undated) DEVICE — GOWN XXL 9575

## (undated) DEVICE — GLOVE BIOGEL PI ULTRATOUCH G SZ 8.0 42180

## (undated) DEVICE — SU PROLENE 0 CT-1 30" 8424H

## (undated) DEVICE — SU MONOCRYL+ 4-0 18IN PS2 UND MCP496G

## (undated) DEVICE — NEEDLE HYPO 22X1-1/2 SAFETY 305900

## (undated) DEVICE — ESU PENCIL SMOKE EVAC W/ROCKER SWITCH 0703-047-000

## (undated) DEVICE — SUTURE PDS 0 27IN CT1 + VIOLET PDP340H

## (undated) DEVICE — DECANTER VIAL 2006S

## (undated) DEVICE — SUCTION MANIFOLD NEPTUNE 2 SYS 1 PORT 702-025-000

## (undated) DEVICE — SU VICRYL+ 3-0 27IN SH UND VCP416H

## (undated) RX ORDER — BUPIVACAINE HYDROCHLORIDE 2.5 MG/ML
INJECTION, SOLUTION EPIDURAL; INFILTRATION; INTRACAUDAL
Status: DISPENSED
Start: 2021-10-25